# Patient Record
Sex: MALE | Race: WHITE | NOT HISPANIC OR LATINO | Employment: FULL TIME | URBAN - METROPOLITAN AREA
[De-identification: names, ages, dates, MRNs, and addresses within clinical notes are randomized per-mention and may not be internally consistent; named-entity substitution may affect disease eponyms.]

---

## 2017-01-06 ENCOUNTER — RECORDS - HEALTHEAST (OUTPATIENT)
Dept: LAB | Facility: CLINIC | Age: 62
End: 2017-01-06

## 2017-01-06 LAB
CHOLEST SERPL-MCNC: 218 MG/DL
FASTING STATUS PATIENT QL REPORTED: NO
HDLC SERPL-MCNC: 34 MG/DL
LDLC SERPL CALC-MCNC: 127 MG/DL
PSA SERPL-MCNC: 3.5 NG/ML (ref 0–4.5)
TRIGL SERPL-MCNC: 283 MG/DL

## 2018-02-21 ENCOUNTER — RECORDS - HEALTHEAST (OUTPATIENT)
Dept: LAB | Facility: CLINIC | Age: 63
End: 2018-02-21

## 2018-02-21 LAB
ALBUMIN SERPL-MCNC: 3.7 G/DL (ref 3.5–5)
ALBUMIN UR-MCNC: ABNORMAL MG/DL
ALP SERPL-CCNC: 107 U/L (ref 45–120)
ALT SERPL W P-5'-P-CCNC: 33 U/L (ref 0–45)
ANION GAP SERPL CALCULATED.3IONS-SCNC: 7 MMOL/L (ref 5–18)
APPEARANCE UR: CLEAR
AST SERPL W P-5'-P-CCNC: 22 U/L (ref 0–40)
BACTERIA #/AREA URNS HPF: ABNORMAL HPF
BILIRUB SERPL-MCNC: 0.6 MG/DL (ref 0–1)
BILIRUB UR QL STRIP: NEGATIVE
BUN SERPL-MCNC: 15 MG/DL (ref 8–22)
CALCIUM SERPL-MCNC: 9.3 MG/DL (ref 8.5–10.5)
CHLORIDE BLD-SCNC: 104 MMOL/L (ref 98–107)
CHOLEST SERPL-MCNC: 164 MG/DL
CO2 SERPL-SCNC: 28 MMOL/L (ref 22–31)
COLOR UR AUTO: YELLOW
CREAT SERPL-MCNC: 1.07 MG/DL (ref 0.7–1.3)
FASTING STATUS PATIENT QL REPORTED: ABNORMAL
GFR SERPL CREATININE-BSD FRML MDRD: >60 ML/MIN/1.73M2
GLUCOSE BLD-MCNC: 109 MG/DL (ref 70–125)
GLUCOSE UR STRIP-MCNC: NEGATIVE MG/DL
HDLC SERPL-MCNC: 31 MG/DL
HGB UR QL STRIP: ABNORMAL
KETONES UR STRIP-MCNC: NEGATIVE MG/DL
LDLC SERPL CALC-MCNC: 62 MG/DL
LDLC SERPL CALC-MCNC: ABNORMAL MG/DL
LEUKOCYTE ESTERASE UR QL STRIP: NEGATIVE
MUCOUS THREADS #/AREA URNS LPF: ABNORMAL LPF
NITRATE UR QL: NEGATIVE
PH UR STRIP: 6 [PH] (ref 4.5–8)
POTASSIUM BLD-SCNC: 4.3 MMOL/L (ref 3.5–5)
PROT SERPL-MCNC: 7 G/DL (ref 6–8)
RBC #/AREA URNS AUTO: ABNORMAL HPF
SODIUM SERPL-SCNC: 139 MMOL/L (ref 136–145)
SP GR UR STRIP: 1.01 (ref 1–1.03)
SQUAMOUS #/AREA URNS AUTO: ABNORMAL LPF
T4 FREE SERPL-MCNC: 0.8 NG/DL (ref 0.7–1.8)
TRIGL SERPL-MCNC: 544 MG/DL
TSH SERPL DL<=0.005 MIU/L-ACNC: 4.01 UIU/ML (ref 0.3–5)
UROBILINOGEN UR STRIP-ACNC: ABNORMAL
WBC #/AREA URNS AUTO: ABNORMAL HPF

## 2019-03-08 ENCOUNTER — RECORDS - HEALTHEAST (OUTPATIENT)
Dept: LAB | Facility: CLINIC | Age: 64
End: 2019-03-08

## 2019-03-08 LAB
ALBUMIN SERPL-MCNC: 3.7 G/DL (ref 3.5–5)
ALP SERPL-CCNC: 101 U/L (ref 45–120)
ALT SERPL W P-5'-P-CCNC: 31 U/L (ref 0–45)
ANION GAP SERPL CALCULATED.3IONS-SCNC: 10 MMOL/L (ref 5–18)
AST SERPL W P-5'-P-CCNC: 21 U/L (ref 0–40)
BILIRUB SERPL-MCNC: 0.5 MG/DL (ref 0–1)
BUN SERPL-MCNC: 16 MG/DL (ref 8–22)
CALCIUM SERPL-MCNC: 9.5 MG/DL (ref 8.5–10.5)
CHLORIDE BLD-SCNC: 105 MMOL/L (ref 98–107)
CHOLEST SERPL-MCNC: 153 MG/DL
CO2 SERPL-SCNC: 25 MMOL/L (ref 22–31)
CREAT SERPL-MCNC: 1.08 MG/DL (ref 0.7–1.3)
FASTING STATUS PATIENT QL REPORTED: ABNORMAL
GFR SERPL CREATININE-BSD FRML MDRD: >60 ML/MIN/1.73M2
GLUCOSE BLD-MCNC: 112 MG/DL (ref 70–125)
HDLC SERPL-MCNC: 31 MG/DL
LDLC SERPL CALC-MCNC: 46 MG/DL
POTASSIUM BLD-SCNC: 4.2 MMOL/L (ref 3.5–5)
PROT SERPL-MCNC: 6.7 G/DL (ref 6–8)
SODIUM SERPL-SCNC: 140 MMOL/L (ref 136–145)
TRIGL SERPL-MCNC: 379 MG/DL
TSH SERPL DL<=0.005 MIU/L-ACNC: 2.79 UIU/ML (ref 0.3–5)

## 2020-03-10 ENCOUNTER — RECORDS - HEALTHEAST (OUTPATIENT)
Dept: LAB | Facility: CLINIC | Age: 65
End: 2020-03-10

## 2020-03-10 LAB
ANION GAP SERPL CALCULATED.3IONS-SCNC: 10 MMOL/L (ref 5–18)
BUN SERPL-MCNC: 14 MG/DL (ref 8–22)
CALCIUM SERPL-MCNC: 9.8 MG/DL (ref 8.5–10.5)
CHLORIDE BLD-SCNC: 103 MMOL/L (ref 98–107)
CO2 SERPL-SCNC: 27 MMOL/L (ref 22–31)
CREAT SERPL-MCNC: 1.13 MG/DL (ref 0.7–1.3)
GFR SERPL CREATININE-BSD FRML MDRD: >60 ML/MIN/1.73M2
GLUCOSE BLD-MCNC: 97 MG/DL (ref 70–125)
POTASSIUM BLD-SCNC: 4.6 MMOL/L (ref 3.5–5)
SODIUM SERPL-SCNC: 140 MMOL/L (ref 136–145)

## 2020-09-11 ENCOUNTER — RECORDS - HEALTHEAST (OUTPATIENT)
Dept: LAB | Facility: CLINIC | Age: 65
End: 2020-09-11

## 2020-09-11 LAB
CHOLEST SERPL-MCNC: 162 MG/DL
FASTING STATUS PATIENT QL REPORTED: NO
HDLC SERPL-MCNC: 34 MG/DL
LDLC SERPL CALC-MCNC: 68 MG/DL
TRIGL SERPL-MCNC: 301 MG/DL
TSH SERPL DL<=0.005 MIU/L-ACNC: 4.24 UIU/ML (ref 0.3–5)

## 2021-01-04 ENCOUNTER — RECORDS - HEALTHEAST (OUTPATIENT)
Dept: LAB | Facility: CLINIC | Age: 66
End: 2021-01-04

## 2021-01-05 LAB
ANION GAP SERPL CALCULATED.3IONS-SCNC: 10 MMOL/L (ref 5–18)
BUN SERPL-MCNC: 18 MG/DL (ref 8–22)
CALCIUM SERPL-MCNC: 9.4 MG/DL (ref 8.5–10.5)
CHLORIDE BLD-SCNC: 100 MMOL/L (ref 98–107)
CO2 SERPL-SCNC: 29 MMOL/L (ref 22–31)
CREAT SERPL-MCNC: 1.25 MG/DL (ref 0.7–1.3)
GFR SERPL CREATININE-BSD FRML MDRD: 58 ML/MIN/1.73M2
GLUCOSE BLD-MCNC: 108 MG/DL (ref 70–125)
POTASSIUM BLD-SCNC: 4 MMOL/L (ref 3.5–5)
SODIUM SERPL-SCNC: 139 MMOL/L (ref 136–145)

## 2021-04-05 ENCOUNTER — RECORDS - HEALTHEAST (OUTPATIENT)
Dept: LAB | Facility: CLINIC | Age: 66
End: 2021-04-05

## 2021-04-06 LAB
ANION GAP SERPL CALCULATED.3IONS-SCNC: 11 MMOL/L (ref 5–18)
BUN SERPL-MCNC: 23 MG/DL (ref 8–22)
CALCIUM SERPL-MCNC: 9.6 MG/DL (ref 8.5–10.5)
CHLORIDE BLD-SCNC: 103 MMOL/L (ref 98–107)
CO2 SERPL-SCNC: 28 MMOL/L (ref 22–31)
CREAT SERPL-MCNC: 1.34 MG/DL (ref 0.7–1.3)
GFR SERPL CREATININE-BSD FRML MDRD: 53 ML/MIN/1.73M2
GLUCOSE BLD-MCNC: 103 MG/DL (ref 70–125)
POTASSIUM BLD-SCNC: 4 MMOL/L (ref 3.5–5)
SODIUM SERPL-SCNC: 142 MMOL/L (ref 136–145)

## 2021-06-02 ENCOUNTER — RECORDS - HEALTHEAST (OUTPATIENT)
Dept: ADMINISTRATIVE | Facility: CLINIC | Age: 66
End: 2021-06-02

## 2021-10-04 ENCOUNTER — LAB REQUISITION (OUTPATIENT)
Dept: LAB | Facility: CLINIC | Age: 66
End: 2021-10-04

## 2021-10-04 DIAGNOSIS — E78.2 MIXED HYPERLIPIDEMIA: ICD-10-CM

## 2021-10-04 DIAGNOSIS — Z12.5 ENCOUNTER FOR SCREENING FOR MALIGNANT NEOPLASM OF PROSTATE: ICD-10-CM

## 2021-10-04 DIAGNOSIS — Z11.8 ENCOUNTER FOR SCREENING FOR OTHER INFECTIOUS AND PARASITIC DISEASES: ICD-10-CM

## 2021-10-04 DIAGNOSIS — I10 ESSENTIAL (PRIMARY) HYPERTENSION: ICD-10-CM

## 2021-10-04 PROCEDURE — 80061 LIPID PANEL: CPT | Performed by: PHYSICIAN ASSISTANT

## 2021-10-04 PROCEDURE — 80053 COMPREHEN METABOLIC PANEL: CPT | Performed by: PHYSICIAN ASSISTANT

## 2021-10-04 PROCEDURE — 87491 CHLMYD TRACH DNA AMP PROBE: CPT | Performed by: PHYSICIAN ASSISTANT

## 2021-10-04 PROCEDURE — G0103 PSA SCREENING: HCPCS | Performed by: PHYSICIAN ASSISTANT

## 2021-10-05 LAB
ALBUMIN SERPL-MCNC: 4 G/DL (ref 3.5–5)
ALP SERPL-CCNC: 113 U/L (ref 45–120)
ALT SERPL W P-5'-P-CCNC: 29 U/L (ref 0–45)
ANION GAP SERPL CALCULATED.3IONS-SCNC: 15 MMOL/L (ref 5–18)
AST SERPL W P-5'-P-CCNC: 20 U/L (ref 0–40)
BILIRUB SERPL-MCNC: 1.2 MG/DL (ref 0–1)
BUN SERPL-MCNC: 23 MG/DL (ref 8–22)
CALCIUM SERPL-MCNC: 10.1 MG/DL (ref 8.5–10.5)
CHLORIDE BLD-SCNC: 98 MMOL/L (ref 98–107)
CHOLEST SERPL-MCNC: 143 MG/DL
CO2 SERPL-SCNC: 25 MMOL/L (ref 22–31)
CREAT SERPL-MCNC: 1.51 MG/DL (ref 0.7–1.3)
FASTING STATUS PATIENT QL REPORTED: ABNORMAL
GFR SERPL CREATININE-BSD FRML MDRD: 47 ML/MIN/1.73M2
GLUCOSE BLD-MCNC: 100 MG/DL (ref 70–125)
HDLC SERPL-MCNC: 32 MG/DL
LDLC SERPL CALC-MCNC: 48 MG/DL
POTASSIUM BLD-SCNC: 4.7 MMOL/L (ref 3.5–5)
PROT SERPL-MCNC: 7.2 G/DL (ref 6–8)
PSA SERPL-MCNC: 2.92 UG/L (ref 0–4.5)
SODIUM SERPL-SCNC: 138 MMOL/L (ref 136–145)
TRIGL SERPL-MCNC: 316 MG/DL

## 2021-10-07 LAB — C TRACH DNA SPEC QL NAA+PROBE: NEGATIVE

## 2023-01-26 ENCOUNTER — LAB REQUISITION (OUTPATIENT)
Dept: LAB | Facility: CLINIC | Age: 68
End: 2023-01-26

## 2023-01-26 DIAGNOSIS — I10 ESSENTIAL (PRIMARY) HYPERTENSION: ICD-10-CM

## 2023-01-26 DIAGNOSIS — E78.2 MIXED HYPERLIPIDEMIA: ICD-10-CM

## 2023-01-26 DIAGNOSIS — Z12.5 ENCOUNTER FOR SCREENING FOR MALIGNANT NEOPLASM OF PROSTATE: ICD-10-CM

## 2023-01-26 LAB
ALBUMIN SERPL BCG-MCNC: 4.3 G/DL (ref 3.5–5.2)
ALP SERPL-CCNC: 114 U/L (ref 40–129)
ALT SERPL W P-5'-P-CCNC: 26 U/L (ref 10–50)
ANION GAP SERPL CALCULATED.3IONS-SCNC: 11 MMOL/L (ref 7–15)
AST SERPL W P-5'-P-CCNC: 20 U/L (ref 10–50)
BILIRUB SERPL-MCNC: 0.7 MG/DL
BUN SERPL-MCNC: 17.2 MG/DL (ref 8–23)
CALCIUM SERPL-MCNC: 10 MG/DL (ref 8.8–10.2)
CHLORIDE SERPL-SCNC: 96 MMOL/L (ref 98–107)
CHOLEST SERPL-MCNC: 158 MG/DL
CREAT SERPL-MCNC: 1.27 MG/DL (ref 0.67–1.17)
DEPRECATED HCO3 PLAS-SCNC: 30 MMOL/L (ref 22–29)
GFR SERPL CREATININE-BSD FRML MDRD: 61 ML/MIN/1.73M2
GLUCOSE SERPL-MCNC: 100 MG/DL (ref 70–99)
HDLC SERPL-MCNC: 35 MG/DL
LDLC SERPL CALC-MCNC: 80 MG/DL
NONHDLC SERPL-MCNC: 123 MG/DL
POTASSIUM SERPL-SCNC: 4.1 MMOL/L (ref 3.4–5.3)
PROT SERPL-MCNC: 6.9 G/DL (ref 6.4–8.3)
PSA SERPL-MCNC: 3.3 NG/ML (ref 0–4.5)
SODIUM SERPL-SCNC: 137 MMOL/L (ref 136–145)
TRIGL SERPL-MCNC: 215 MG/DL

## 2023-01-26 PROCEDURE — 82947 ASSAY GLUCOSE BLOOD QUANT: CPT | Performed by: PHYSICIAN ASSISTANT

## 2023-01-26 PROCEDURE — 80061 LIPID PANEL: CPT | Performed by: PHYSICIAN ASSISTANT

## 2023-01-26 PROCEDURE — G0103 PSA SCREENING: HCPCS | Performed by: PHYSICIAN ASSISTANT

## 2023-03-24 ENCOUNTER — LAB REQUISITION (OUTPATIENT)
Dept: LAB | Facility: CLINIC | Age: 68
End: 2023-03-24

## 2023-03-24 DIAGNOSIS — E87.5 HYPERKALEMIA: ICD-10-CM

## 2023-03-24 LAB
ANION GAP SERPL CALCULATED.3IONS-SCNC: 12 MMOL/L (ref 7–15)
BUN SERPL-MCNC: 17 MG/DL (ref 8–23)
CALCIUM SERPL-MCNC: 9.4 MG/DL (ref 8.8–10.2)
CHLORIDE SERPL-SCNC: 104 MMOL/L (ref 98–107)
CREAT SERPL-MCNC: 1.21 MG/DL (ref 0.67–1.17)
DEPRECATED HCO3 PLAS-SCNC: 24 MMOL/L (ref 22–29)
GFR SERPL CREATININE-BSD FRML MDRD: 64 ML/MIN/1.73M2
GLUCOSE SERPL-MCNC: 84 MG/DL (ref 70–99)
POTASSIUM SERPL-SCNC: 4.5 MMOL/L (ref 3.4–5.3)
SODIUM SERPL-SCNC: 140 MMOL/L (ref 136–145)

## 2023-03-24 PROCEDURE — 80048 BASIC METABOLIC PNL TOTAL CA: CPT | Performed by: PHYSICIAN ASSISTANT

## 2023-07-07 ENCOUNTER — LAB REQUISITION (OUTPATIENT)
Dept: LAB | Facility: CLINIC | Age: 68
End: 2023-07-07

## 2023-07-07 DIAGNOSIS — R10.9 UNSPECIFIED ABDOMINAL PAIN: ICD-10-CM

## 2023-07-07 LAB
ALBUMIN SERPL BCG-MCNC: 4.3 G/DL (ref 3.5–5.2)
ALP SERPL-CCNC: 1149 U/L (ref 40–129)
ALT SERPL W P-5'-P-CCNC: 299 U/L (ref 0–70)
ANION GAP SERPL CALCULATED.3IONS-SCNC: 15 MMOL/L (ref 7–15)
AST SERPL W P-5'-P-CCNC: 149 U/L (ref 0–45)
BILIRUB SERPL-MCNC: 2.5 MG/DL
BUN SERPL-MCNC: 17.3 MG/DL (ref 8–23)
CALCIUM SERPL-MCNC: 9.9 MG/DL (ref 8.8–10.2)
CHLORIDE SERPL-SCNC: 98 MMOL/L (ref 98–107)
CREAT SERPL-MCNC: 1.35 MG/DL (ref 0.67–1.17)
DEPRECATED HCO3 PLAS-SCNC: 24 MMOL/L (ref 22–29)
GFR SERPL CREATININE-BSD FRML MDRD: 56 ML/MIN/1.73M2
GLUCOSE SERPL-MCNC: 108 MG/DL (ref 70–99)
POTASSIUM SERPL-SCNC: 4.8 MMOL/L (ref 3.4–5.3)
PROT SERPL-MCNC: 7.4 G/DL (ref 6.4–8.3)
SODIUM SERPL-SCNC: 137 MMOL/L (ref 136–145)

## 2023-07-07 PROCEDURE — 80053 COMPREHEN METABOLIC PANEL: CPT | Performed by: PHYSICIAN ASSISTANT

## 2023-07-21 ENCOUNTER — HOSPITAL ENCOUNTER (INPATIENT)
Facility: HOSPITAL | Age: 68
LOS: 5 days | Discharge: HOME | End: 2023-07-26
Attending: EMERGENCY MEDICINE | Admitting: INTERNAL MEDICINE
Payer: COMMERCIAL

## 2023-07-21 ENCOUNTER — ANESTHESIA EVENT (OUTPATIENT)
Dept: SURGERY | Facility: HOSPITAL | Age: 68
End: 2023-07-21
Payer: COMMERCIAL

## 2023-07-21 ENCOUNTER — APPOINTMENT (OUTPATIENT)
Dept: CT IMAGING | Facility: HOSPITAL | Age: 68
End: 2023-07-21
Attending: EMERGENCY MEDICINE
Payer: COMMERCIAL

## 2023-07-21 DIAGNOSIS — N17.9 ACUTE RENAL FAILURE, UNSPECIFIED ACUTE RENAL FAILURE TYPE (H): ICD-10-CM

## 2023-07-21 DIAGNOSIS — K83.1 BILIARY STRICTURE (H): Primary | ICD-10-CM

## 2023-07-21 DIAGNOSIS — R10.11 RUQ ABDOMINAL PAIN: ICD-10-CM

## 2023-07-21 DIAGNOSIS — R74.8 ABNORMAL LIVER ENZYMES: ICD-10-CM

## 2023-07-21 DIAGNOSIS — R11.2 NAUSEA AND VOMITING, UNSPECIFIED VOMITING TYPE: ICD-10-CM

## 2023-07-21 LAB
ALBUMIN MFR UR ELPH: 174.8 MG/DL
ALBUMIN SERPL BCG-MCNC: 3.3 G/DL (ref 3.5–5.2)
ALBUMIN UR-MCNC: 100 MG/DL
ALP SERPL-CCNC: 1870 U/L (ref 40–129)
ALT SERPL W P-5'-P-CCNC: 311 U/L (ref 0–70)
ANION GAP SERPL CALCULATED.3IONS-SCNC: 19 MMOL/L (ref 7–15)
ANION GAP SERPL CALCULATED.3IONS-SCNC: 19 MMOL/L (ref 7–15)
APAP SERPL-MCNC: <5 UG/ML (ref 10–30)
APPEARANCE UR: ABNORMAL
APTT PPP: 32 SECONDS (ref 22–38)
AST SERPL W P-5'-P-CCNC: 339 U/L (ref 0–45)
BACTERIA #/AREA URNS HPF: ABNORMAL /HPF
BASOPHILS # BLD AUTO: 0 10E3/UL (ref 0–0.2)
BASOPHILS NFR BLD AUTO: 0 %
BILIRUB DIRECT SERPL-MCNC: 2.85 MG/DL (ref 0–0.3)
BILIRUB SERPL-MCNC: 4 MG/DL
BILIRUB UR QL STRIP: ABNORMAL
BUN SERPL-MCNC: 99.4 MG/DL (ref 8–23)
BUN SERPL-MCNC: 99.4 MG/DL (ref 8–23)
CALCIUM SERPL-MCNC: 9.3 MG/DL (ref 8.8–10.2)
CALCIUM SERPL-MCNC: 9.3 MG/DL (ref 8.8–10.2)
CHLORIDE SERPL-SCNC: 97 MMOL/L (ref 98–107)
CHLORIDE SERPL-SCNC: 97 MMOL/L (ref 98–107)
CK SERPL-CCNC: 113 U/L (ref 39–308)
COLOR UR AUTO: ABNORMAL
CREAT SERPL-MCNC: 12.36 MG/DL (ref 0.67–1.17)
CREAT SERPL-MCNC: 12.36 MG/DL (ref 0.67–1.17)
CREAT UR-MCNC: 151.6 MG/DL
DEPRECATED HCO3 PLAS-SCNC: 16 MMOL/L (ref 22–29)
DEPRECATED HCO3 PLAS-SCNC: 16 MMOL/L (ref 22–29)
EOSINOPHIL # BLD AUTO: 0 10E3/UL (ref 0–0.7)
EOSINOPHIL NFR BLD AUTO: 0 %
ERYTHROCYTE [DISTWIDTH] IN BLOOD BY AUTOMATED COUNT: 14.8 % (ref 10–15)
ETHANOL SERPL-MCNC: <0.01 G/DL
GFR SERPL CREATININE-BSD FRML MDRD: 4 ML/MIN/1.73M2
GFR SERPL CREATININE-BSD FRML MDRD: 4 ML/MIN/1.73M2
GLUCOSE BLDC GLUCOMTR-MCNC: 113 MG/DL (ref 70–99)
GLUCOSE BLDC GLUCOMTR-MCNC: 130 MG/DL (ref 70–99)
GLUCOSE BLDC GLUCOMTR-MCNC: 148 MG/DL (ref 70–99)
GLUCOSE BLDC GLUCOMTR-MCNC: 167 MG/DL (ref 70–99)
GLUCOSE BLDC GLUCOMTR-MCNC: 182 MG/DL (ref 70–99)
GLUCOSE BLDC GLUCOMTR-MCNC: 80 MG/DL (ref 70–99)
GLUCOSE BLDC GLUCOMTR-MCNC: 96 MG/DL (ref 70–99)
GLUCOSE BLDC GLUCOMTR-MCNC: 98 MG/DL (ref 70–99)
GLUCOSE SERPL-MCNC: 100 MG/DL (ref 70–99)
GLUCOSE SERPL-MCNC: 100 MG/DL (ref 70–99)
GLUCOSE UR STRIP-MCNC: 50 MG/DL
GRANULAR CAST: 52 /LPF
HCT VFR BLD AUTO: 38.7 % (ref 40–53)
HGB BLD-MCNC: 12.7 G/DL (ref 13.3–17.7)
HGB UR QL STRIP: ABNORMAL
HOLD SPECIMEN: NORMAL
HOLD SPECIMEN: NORMAL
IMM GRANULOCYTES # BLD: 0.1 10E3/UL
IMM GRANULOCYTES NFR BLD: 1 %
INR PPP: 1.24 (ref 0.85–1.15)
INR PPP: 1.28 (ref 0.85–1.15)
KETONES UR STRIP-MCNC: NEGATIVE MG/DL
LEUKOCYTE ESTERASE UR QL STRIP: ABNORMAL
LIPASE SERPL-CCNC: 841 U/L (ref 13–60)
LYMPHOCYTES # BLD AUTO: 0.8 10E3/UL (ref 0.8–5.3)
LYMPHOCYTES NFR BLD AUTO: 5 %
MCH RBC QN AUTO: 31.1 PG (ref 26.5–33)
MCHC RBC AUTO-ENTMCNC: 32.8 G/DL (ref 31.5–36.5)
MCV RBC AUTO: 95 FL (ref 78–100)
MONOCYTES # BLD AUTO: 0.8 10E3/UL (ref 0–1.3)
MONOCYTES NFR BLD AUTO: 5 %
MUCOUS THREADS #/AREA URNS LPF: PRESENT /LPF
NEUTROPHILS # BLD AUTO: 14.3 10E3/UL (ref 1.6–8.3)
NEUTROPHILS NFR BLD AUTO: 89 %
NITRATE UR QL: NEGATIVE
NRBC # BLD AUTO: 0 10E3/UL
NRBC BLD AUTO-RTO: 0 /100
PH UR STRIP: 5 [PH] (ref 5–7)
PLATELET # BLD AUTO: 272 10E3/UL (ref 150–450)
POTASSIUM SERPL-SCNC: 5.4 MMOL/L (ref 3.4–5.3)
POTASSIUM SERPL-SCNC: 5.8 MMOL/L (ref 3.4–5.3)
POTASSIUM SERPL-SCNC: 5.9 MMOL/L (ref 3.4–5.3)
POTASSIUM SERPL-SCNC: 5.9 MMOL/L (ref 3.4–5.3)
POTASSIUM SERPL-SCNC: 6.1 MMOL/L (ref 3.4–5.3)
PROT SERPL-MCNC: 7.4 G/DL (ref 6.4–8.3)
PROT/CREAT 24H UR: 1.15 MG/MG CR (ref 0–0.2)
RBC # BLD AUTO: 4.08 10E6/UL (ref 4.4–5.9)
RBC URINE: 0 /HPF
SODIUM SERPL-SCNC: 132 MMOL/L (ref 136–145)
SODIUM SERPL-SCNC: 132 MMOL/L (ref 136–145)
SODIUM UR-SCNC: 32 MMOL/L
SP GR UR STRIP: 1.02 (ref 1–1.03)
SQUAMOUS EPITHELIAL: 1 /HPF
TRANSITIONAL EPI: <1 /HPF
UROBILINOGEN UR STRIP-MCNC: <2 MG/DL
WBC # BLD AUTO: 16.1 10E3/UL (ref 4–11)
WBC URINE: 28 /HPF

## 2023-07-21 PROCEDURE — 80074 ACUTE HEPATITIS PANEL: CPT | Performed by: EMERGENCY MEDICINE

## 2023-07-21 PROCEDURE — 84132 ASSAY OF SERUM POTASSIUM: CPT | Performed by: EMERGENCY MEDICINE

## 2023-07-21 PROCEDURE — 85025 COMPLETE CBC W/AUTO DIFF WBC: CPT | Performed by: EMERGENCY MEDICINE

## 2023-07-21 PROCEDURE — 250N000009 HC RX 250: Performed by: EMERGENCY MEDICINE

## 2023-07-21 PROCEDURE — 82248 BILIRUBIN DIRECT: CPT | Performed by: EMERGENCY MEDICINE

## 2023-07-21 PROCEDURE — 86038 ANTINUCLEAR ANTIBODIES: CPT | Performed by: INTERNAL MEDICINE

## 2023-07-21 PROCEDURE — 36415 COLL VENOUS BLD VENIPUNCTURE: CPT | Performed by: EMERGENCY MEDICINE

## 2023-07-21 PROCEDURE — 250N000012 HC RX MED GY IP 250 OP 636 PS 637: Performed by: EMERGENCY MEDICINE

## 2023-07-21 PROCEDURE — 86162 COMPLEMENT TOTAL (CH50): CPT | Performed by: INTERNAL MEDICINE

## 2023-07-21 PROCEDURE — 84132 ASSAY OF SERUM POTASSIUM: CPT | Performed by: INTERNAL MEDICINE

## 2023-07-21 PROCEDURE — 84300 ASSAY OF URINE SODIUM: CPT | Performed by: INTERNAL MEDICINE

## 2023-07-21 PROCEDURE — 258N000001 HC RX 258: Performed by: EMERGENCY MEDICINE

## 2023-07-21 PROCEDURE — 96376 TX/PRO/DX INJ SAME DRUG ADON: CPT

## 2023-07-21 PROCEDURE — 250N000009 HC RX 250: Performed by: INTERNAL MEDICINE

## 2023-07-21 PROCEDURE — 80053 COMPREHEN METABOLIC PANEL: CPT | Performed by: EMERGENCY MEDICINE

## 2023-07-21 PROCEDURE — 82550 ASSAY OF CK (CPK): CPT | Performed by: INTERNAL MEDICINE

## 2023-07-21 PROCEDURE — 80143 DRUG ASSAY ACETAMINOPHEN: CPT | Performed by: EMERGENCY MEDICINE

## 2023-07-21 PROCEDURE — 99255 IP/OBS CONSLTJ NEW/EST HI 80: CPT | Performed by: INTERNAL MEDICINE

## 2023-07-21 PROCEDURE — 258N000003 HC RX IP 258 OP 636: Performed by: INTERNAL MEDICINE

## 2023-07-21 PROCEDURE — 85610 PROTHROMBIN TIME: CPT | Performed by: EMERGENCY MEDICINE

## 2023-07-21 PROCEDURE — 99223 1ST HOSP IP/OBS HIGH 75: CPT | Performed by: INTERNAL MEDICINE

## 2023-07-21 PROCEDURE — 82962 GLUCOSE BLOOD TEST: CPT

## 2023-07-21 PROCEDURE — 82077 ASSAY SPEC XCP UR&BREATH IA: CPT | Performed by: EMERGENCY MEDICINE

## 2023-07-21 PROCEDURE — 120N000001 HC R&B MED SURG/OB

## 2023-07-21 PROCEDURE — 83690 ASSAY OF LIPASE: CPT | Performed by: EMERGENCY MEDICINE

## 2023-07-21 PROCEDURE — 96375 TX/PRO/DX INJ NEW DRUG ADDON: CPT

## 2023-07-21 PROCEDURE — 258N000003 HC RX IP 258 OP 636: Performed by: EMERGENCY MEDICINE

## 2023-07-21 PROCEDURE — 93005 ELECTROCARDIOGRAM TRACING: CPT | Performed by: EMERGENCY MEDICINE

## 2023-07-21 PROCEDURE — 250N000011 HC RX IP 250 OP 636: Mod: JZ | Performed by: EMERGENCY MEDICINE

## 2023-07-21 PROCEDURE — 85610 PROTHROMBIN TIME: CPT | Performed by: INTERNAL MEDICINE

## 2023-07-21 PROCEDURE — 85730 THROMBOPLASTIN TIME PARTIAL: CPT | Performed by: EMERGENCY MEDICINE

## 2023-07-21 PROCEDURE — 36415 COLL VENOUS BLD VENIPUNCTURE: CPT | Performed by: INTERNAL MEDICINE

## 2023-07-21 PROCEDURE — 87086 URINE CULTURE/COLONY COUNT: CPT | Performed by: EMERGENCY MEDICINE

## 2023-07-21 PROCEDURE — 81001 URINALYSIS AUTO W/SCOPE: CPT | Performed by: EMERGENCY MEDICINE

## 2023-07-21 PROCEDURE — 86160 COMPLEMENT ANTIGEN: CPT | Performed by: INTERNAL MEDICINE

## 2023-07-21 PROCEDURE — 96365 THER/PROPH/DIAG IV INF INIT: CPT | Mod: 59

## 2023-07-21 PROCEDURE — 99285 EMERGENCY DEPT VISIT HI MDM: CPT | Mod: 25

## 2023-07-21 PROCEDURE — 84156 ASSAY OF PROTEIN URINE: CPT | Performed by: INTERNAL MEDICINE

## 2023-07-21 PROCEDURE — 250N000013 HC RX MED GY IP 250 OP 250 PS 637: Performed by: EMERGENCY MEDICINE

## 2023-07-21 PROCEDURE — 74176 CT ABD & PELVIS W/O CONTRAST: CPT

## 2023-07-21 RX ORDER — SILDENAFIL 100 MG/1
100 TABLET, FILM COATED ORAL DAILY PRN
COMMUNITY

## 2023-07-21 RX ORDER — ASPIRIN 81 MG/1
81 TABLET ORAL AT BEDTIME
COMMUNITY

## 2023-07-21 RX ORDER — CALCIUM GLUCONATE 20 MG/ML
1 INJECTION, SOLUTION INTRAVENOUS ONCE
Status: COMPLETED | OUTPATIENT
Start: 2023-07-21 | End: 2023-07-21

## 2023-07-21 RX ORDER — LIDOCAINE 40 MG/G
CREAM TOPICAL
Status: DISCONTINUED | OUTPATIENT
Start: 2023-07-21 | End: 2023-07-26 | Stop reason: HOSPADM

## 2023-07-21 RX ORDER — PIPERACILLIN SODIUM, TAZOBACTAM SODIUM 3; .375 G/15ML; G/15ML
3.38 INJECTION, POWDER, LYOPHILIZED, FOR SOLUTION INTRAVENOUS EVERY 12 HOURS
Status: COMPLETED | OUTPATIENT
Start: 2023-07-22 | End: 2023-07-22

## 2023-07-21 RX ORDER — NITROGLYCERIN 0.4 MG/1
0.4 TABLET SUBLINGUAL EVERY 5 MIN PRN
Status: DISCONTINUED | OUTPATIENT
Start: 2023-07-21 | End: 2023-07-26 | Stop reason: HOSPADM

## 2023-07-21 RX ORDER — NICOTINE POLACRILEX 4 MG
15-30 LOZENGE BUCCAL
Status: DISCONTINUED | OUTPATIENT
Start: 2023-07-21 | End: 2023-07-26 | Stop reason: HOSPADM

## 2023-07-21 RX ORDER — CLOPIDOGREL BISULFATE 75 MG/1
75 TABLET ORAL DAILY
COMMUNITY

## 2023-07-21 RX ORDER — NITROGLYCERIN 0.4 MG/1
0.4 TABLET SUBLINGUAL EVERY 5 MIN PRN
COMMUNITY

## 2023-07-21 RX ORDER — LOSARTAN POTASSIUM 50 MG/1
50 TABLET ORAL AT BEDTIME
Status: ON HOLD | COMMUNITY
End: 2023-07-26

## 2023-07-21 RX ORDER — PROCHLORPERAZINE MALEATE 5 MG
5 TABLET ORAL EVERY 6 HOURS PRN
Status: DISCONTINUED | OUTPATIENT
Start: 2023-07-21 | End: 2023-07-26 | Stop reason: HOSPADM

## 2023-07-21 RX ORDER — ONDANSETRON 2 MG/ML
4 INJECTION INTRAMUSCULAR; INTRAVENOUS ONCE
Status: COMPLETED | OUTPATIENT
Start: 2023-07-21 | End: 2023-07-21

## 2023-07-21 RX ORDER — PANTOPRAZOLE SODIUM 40 MG/1
40 TABLET, DELAYED RELEASE ORAL AT BEDTIME
COMMUNITY

## 2023-07-21 RX ORDER — DEXTROSE MONOHYDRATE 25 G/50ML
25 INJECTION, SOLUTION INTRAVENOUS ONCE
Status: COMPLETED | OUTPATIENT
Start: 2023-07-21 | End: 2023-07-21

## 2023-07-21 RX ORDER — ONDANSETRON 2 MG/ML
4 INJECTION INTRAMUSCULAR; INTRAVENOUS EVERY 6 HOURS PRN
Status: DISCONTINUED | OUTPATIENT
Start: 2023-07-21 | End: 2023-07-26 | Stop reason: HOSPADM

## 2023-07-21 RX ORDER — DICYCLOMINE HCL 20 MG
20 TABLET ORAL ONCE
Status: COMPLETED | OUTPATIENT
Start: 2023-07-21 | End: 2023-07-21

## 2023-07-21 RX ORDER — PIPERACILLIN SODIUM, TAZOBACTAM SODIUM 2; .25 G/10ML; G/10ML
2.25 INJECTION, POWDER, LYOPHILIZED, FOR SOLUTION INTRAVENOUS EVERY 8 HOURS
Status: DISCONTINUED | OUTPATIENT
Start: 2023-07-21 | End: 2023-07-21 | Stop reason: DRUGHIGH

## 2023-07-21 RX ORDER — ACETAMINOPHEN 325 MG/1
325 TABLET ORAL EVERY 6 HOURS PRN
Status: DISCONTINUED | OUTPATIENT
Start: 2023-07-21 | End: 2023-07-26 | Stop reason: HOSPADM

## 2023-07-21 RX ORDER — ROSUVASTATIN CALCIUM 40 MG/1
40 TABLET, COATED ORAL AT BEDTIME
COMMUNITY

## 2023-07-21 RX ORDER — PIPERACILLIN SODIUM, TAZOBACTAM SODIUM 3; .375 G/15ML; G/15ML
3.38 INJECTION, POWDER, LYOPHILIZED, FOR SOLUTION INTRAVENOUS ONCE
Status: COMPLETED | OUTPATIENT
Start: 2023-07-21 | End: 2023-07-21

## 2023-07-21 RX ORDER — PROCHLORPERAZINE 25 MG
12.5 SUPPOSITORY, RECTAL RECTAL EVERY 12 HOURS PRN
Status: DISCONTINUED | OUTPATIENT
Start: 2023-07-21 | End: 2023-07-26 | Stop reason: HOSPADM

## 2023-07-21 RX ORDER — ONDANSETRON 4 MG/1
4 TABLET, ORALLY DISINTEGRATING ORAL EVERY 6 HOURS PRN
Status: DISCONTINUED | OUTPATIENT
Start: 2023-07-21 | End: 2023-07-26 | Stop reason: HOSPADM

## 2023-07-21 RX ORDER — DEXTROSE MONOHYDRATE 25 G/50ML
25-50 INJECTION, SOLUTION INTRAVENOUS
Status: DISCONTINUED | OUTPATIENT
Start: 2023-07-21 | End: 2023-07-26 | Stop reason: HOSPADM

## 2023-07-21 RX ORDER — METOPROLOL SUCCINATE 25 MG/1
25 TABLET, EXTENDED RELEASE ORAL AT BEDTIME
COMMUNITY

## 2023-07-21 RX ORDER — ACETAMINOPHEN 650 MG/1
650 SUPPOSITORY RECTAL EVERY 6 HOURS PRN
Status: DISCONTINUED | OUTPATIENT
Start: 2023-07-21 | End: 2023-07-26 | Stop reason: HOSPADM

## 2023-07-21 RX ADMIN — ONDANSETRON 4 MG: 2 INJECTION INTRAMUSCULAR; INTRAVENOUS at 13:20

## 2023-07-21 RX ADMIN — FAMOTIDINE 20 MG: 10 INJECTION, SOLUTION INTRAVENOUS at 13:24

## 2023-07-21 RX ADMIN — SODIUM BICARBONATE 50 MEQ: 84 INJECTION, SOLUTION INTRAVENOUS at 15:16

## 2023-07-21 RX ADMIN — SODIUM BICARBONATE: 84 INJECTION, SOLUTION INTRAVENOUS at 16:38

## 2023-07-21 RX ADMIN — SODIUM CHLORIDE 9.3 UNITS: 9 INJECTION, SOLUTION INTRAVENOUS at 15:09

## 2023-07-21 RX ADMIN — PIPERACILLIN AND TAZOBACTAM 3.38 G: 3; .375 INJECTION, POWDER, LYOPHILIZED, FOR SOLUTION INTRAVENOUS at 17:35

## 2023-07-21 RX ADMIN — DICYCLOMINE HYDROCHLORIDE 20 MG: 20 TABLET ORAL at 13:27

## 2023-07-21 RX ADMIN — DEXTROSE MONOHYDRATE 300 ML: 100 INJECTION, SOLUTION INTRAVENOUS at 15:28

## 2023-07-21 RX ADMIN — CALCIUM GLUCONATE 1 G: 20 INJECTION, SOLUTION INTRAVENOUS at 15:11

## 2023-07-21 RX ADMIN — DEXTROSE MONOHYDRATE 25 G: 25 INJECTION, SOLUTION INTRAVENOUS at 15:04

## 2023-07-21 ASSESSMENT — ACTIVITIES OF DAILY LIVING (ADL)
ADLS_ACUITY_SCORE: 35
ADLS_ACUITY_SCORE: 35
DRESSING/BATHING_DIFFICULTY: NO
WALKING_OR_CLIMBING_STAIRS_DIFFICULTY: NO
ADLS_ACUITY_SCORE: 37
DEPENDENT_IADLS:: INDEPENDENT
TOILETING_ISSUES: NO
VISION_MANAGEMENT: GLASSES
DOING_ERRANDS_INDEPENDENTLY_DIFFICULTY: NO
CONCENTRATING,_REMEMBERING_OR_MAKING_DECISIONS_DIFFICULTY: NO
WEAR_GLASSES_OR_BLIND: YES
ADLS_ACUITY_SCORE: 22
ADLS_ACUITY_SCORE: 35
DIFFICULTY_EATING/SWALLOWING: NO

## 2023-07-21 NOTE — H&P
[facility]    History and Physical - Hospitalist Service       Date of Admission:  7/21/2023    Assessment & Plan      Rafael Santillan is a 68 year old male admitted on 7/21/2023. He presented with 2 weeks of off-and-on abdominal pain nausea and vomiting was found to have acute renal failure, hyperkalemia, elevated liver enzymes.  Nephrology and GI consulted.  Patient is n.p.o. for ERCP.  Started on bicarb drip.  UA is positive for leukocytes and therefore on empirical Zosyn.    Abdominal pain and elevated liver enzyme likely due to biliary stricture  --Patient has biliary stents.  --GI consulted for ERCP  --Started on empirical Zosyn  --Currently on bicarb drip    Acute renal failure with hyperkalemia  Metabolic acidosis  --Started on bicarb drip  --Baseline creatinine 1.3  Hold losartan, statin, aspirin, Plavix    GERD.    -Continue home medications  --Order IV Protonix    Hyponatremia likely due to hypovolemia  --Currently on bicarb drip    Leukocytosis likely reactive  --Continue empirical Zosyn  Follow-up urine culture    History of mitral regurgitation  --Does not appear to be fluid overloaded  --Monitor fluid status every day to avoid hypervolemia     Diet: NPO for Medical/Clinical Reasons Except for: No Exceptions  NPO per Anesthesia Guidelines for Procedure/Surgery Except for: Meds    DVT Prophylaxis: Low Risk/Ambulatory with no VTE prophylaxis indicated  Cordero Catheter: Not present  Lines: None     Cardiac Monitoring: None  Code Status:  Full    Clinically Significant Risk Factors Present on Admission        # Hyperkalemia: Highest K = 5.9 mmol/L in last 2 days, will monitor as appropriate      # Anion Gap Metabolic Acidosis: Highest Anion Gap = 19 mmol/L in last 2 days, will monitor and treat as appropriate  # Hypoalbuminemia: Lowest albumin = 3.3 g/dL at 7/21/2023  1:13 PM, will monitor as appropriate  # Coagulation Defect: INR = 1.24 (Ref range: 0.85 - 1.15) and/or PTT = 32  "Seconds (Ref range: 22 - 38 Seconds), will monitor for bleeding   # Acute Kidney Injury, unspecified: based on a >150% or 0.3 mg/dL increase in last creatinine compared to past 90 day average, will monitor renal function       # Overweight: Estimated body mass index is 29.41 kg/m  as calculated from the following:    Height as of this encounter: 1.778 m (5' 10\").    Weight as of this encounter: 93 kg (205 lb).            Disposition Plan      Expected Discharge Date: 07/23/2023                  Jian Jeronimo MD  Hospitalist Service  [facility]  Securely message with [employer] (more info)  Text page via [facility] Paging/Directory     ______________________________________________________________________    Chief Complaint   Abdominal pain, nausea, vomiting,  unable to maintain hydration- 2 weeks    History is obtained from the patient    History of Present Illness   Rafael Santillan is a 68 year old male with history of biliary stents, GERD, hypertension, CKD 3 with creatinine of 1.3 who presented with 2 weeks of off-and-on abdominal pain, nausea and vomiting.  Patient claims he is unable to keep his hydration although he tries to drink a water daily.  He was scheduled to have ERCP as outpatient but he is unable to take the pain anymore.  In the ER patient was found to have acute renal failure and hyperkalemia as well as elevated liver enzymes and therefore nephrology and GI consulted.  Patient is now on bicarb drip.        Past Medical History    No past medical history on file.    Past Surgical History   No past surgical history on file.    Prior to Admission Medications   None        Social History   I have reviewed this patient's social history and updated it with pertinent information if needed.    Drinks 2-3 beers daily.  Last drink was about 5 days ago  Does not smoke    Physical Exam   Vital Signs: Temp: 97.9  F (36.6  C) Temp src: Temporal BP: 113/58 Pulse: 61   Resp: 21 SpO2: 99 % O2 Device: " None (Room air)    Weight: 205 lbs 0 oz    Dry mucous membrane  Appears flushed  Systolic murmur   Lungs clear to auscultation      Medical Decision Making       75 MINUTES SPENT BY ME on the date of service doing chart review, history, exam, documentation & further activities per the note.  MANAGEMENT DISCUSSED with the following over the past 24 hours: Patient   NOTE(S)/MEDICAL RECORDS REVIEWED over the past 24 hours: ER notes      Data     I have personally reviewed the following data over the past 24 hrs:    16.1 (H)  \   12.7 (L)   / 272     132 (L) 97 (L) 99.4 (H) /  167 (H)   5.8 (H) 16 (L) 12.36 (H) \       ALT: 311 (H) AST: 339 (H) AP: 1,870 (H) TBILI: 4.0 (H)   ALB: 3.3 (L) TOT PROTEIN: 7.4 LIPASE: 841 (H)       INR:  1.24 (H) PTT:  32   D-dimer:  N/A Fibrinogen:  N/A       Imaging results reviewed over the past 24 hrs:   Recent Results (from the past 24 hour(s))   CT Abdomen Pelvis w/o Contrast    Narrative    EXAM: CT ABDOMEN PELVIS W/O CONTRAST  LOCATION: [facility]  DATE: 7/21/2023    INDICATION: right side abd pain, vomiting for 2 weeks  COMPARISON: 06/20/2010  TECHNIQUE: CT scan of the abdomen and pelvis was performed without IV contrast. Multiplanar reformats were obtained. Dose reduction techniques were used.  CONTRAST: None.    FINDINGS:   LOWER CHEST: Partially imaged coronary artery calcifications    HEPATOBILIARY: Cholecystectomy. Progressive atrophy of the left hepatic lobe with some mild intrahepatic biliary dilation    PANCREAS: Normal.    SPLEEN: Normal.    ADRENAL GLANDS: Normal.    KIDNEYS/BLADDER: No significant mass, stone, or hydronephrosis. Circumferential bladder wall thickening.    BOWEL: Diverticulosis of the colon. No acute inflammatory change. No obstruction.     LYMPH NODES: Normal.    VASCULATURE: Minimal atherosclerotic calcification. No aneurysm.    PELVIC ORGANS: Enlarged prostate.    MUSCULOSKELETAL: Fat-containing right inguinal hernia. Right lower  quadrant spigelian hernia containing nonobstructed small bowel. Mild degenerative changes in the spine.      Impression    IMPRESSION:   1.  Circumferential urinary bladder wall thickening could be secondary to underdistention or cystitis. Correlate with urinalysis.     2.  Sequelae of chronic biliary stricture in the left hepatic lobe with associated mild intrahepatic biliary ductal dilatation and parenchymal atrophy.           no

## 2023-07-21 NOTE — PHARMACY-ADMISSION MEDICATION HISTORY
"Pharmacist Admission Medication History    Admission medication history is complete. The information provided in this note is only as accurate as the sources available at the time of the update.    Medication reconciliation/reorder completed by provider prior to medication history? No    Information Source(s): Patient and CareEverywhere/SureScripts via in-person    Pertinent Information:   -Pt states he takes all meds at bedtime except for \"an oblong medication\" he takes for \"blood build up around stents\". Pt takes this when he wakes up, usually around 3am. Suspect this is plavix. Pt states he was told to separate it from other medications, unclear why he was told to do so.     Changes made to PTA medication list:    Added: all    Deleted: None    Changed: None    Medication Affordability:  Not including over the counter (OTC) medications, was there a time in the past 3 months when you did not take your medications as prescribed because of cost?: No    Allergies reviewed with patient and updates made in EHR: yes    Medication History Completed By: Elizabeth Fregoso [facility] 7/21/2023 5:06 PM    Prior to Admission medications    Medication Sig Last Dose Taking? Auth Provider Long Term End Date   aspirin 81 MG EC tablet Take 81 mg by mouth At Bedtime 7/20/2023 at pm Yes Unknown, Entered By History     clopidogrel (PLAVIX) 75 MG tablet Take 75 mg by mouth daily 7/21/2023 at 3am Yes Unknown, Entered By History Yes    losartan (COZAAR) 50 MG tablet Take 50 mg by mouth At Bedtime 7/20/2023 Yes Unknown, Entered By History Yes    metoprolol succinate ER (TOPROL XL) 25 MG 24 hr tablet Take 25 mg by mouth At Bedtime 7/20/2023 at pm Yes Unknown, Entered By History Yes    nitroGLYcerin (NITROSTAT) 0.4 MG sublingual tablet Place 0.4 mg under the tongue every 5 minutes as needed for chest pain For chest pain place 1 tablet under the tongue every 5 minutes for 3 doses. If symptoms persist 5 minutes after 1st dose call 911. Unknown at " prn Yes Unknown, Entered By History Yes    pantoprazole (PROTONIX) 40 MG EC tablet Take 40 mg by mouth At Bedtime 7/20/2023 at pm Yes Unknown, Entered By History     rosuvastatin (CRESTOR) 40 MG tablet Take 40 mg by mouth At Bedtime  Yes Unknown, Entered By History Yes    sildenafil (VIAGRA) 100 MG tablet Take 100 mg by mouth daily as needed Unknown at prn Yes Unknown, Entered By History Yes

## 2023-07-21 NOTE — ED PROVIDER NOTES
Emergency Department Encounter     Evaluation Date & Time:   No admission date for patient encounter.    CHIEF COMPLAINT:  Abdominal Pain      Triage Note:  Pt presents with upper right abd pain. Pt has been dealing with this pain for some time. Had labs done on 7/07,   Scan on 7/17. Labs revealed elevated AST, ALT. Pt was to have ERCP with [facility] but is a 6 week wait. Pt states he cannot take this pain and nausea that long. Pt reports unable to eat or drink, due to nausea. Vomited this am.                ED COURSE & MEDICAL DECISION MAKING:     ED Course as of 07/21/23 1921 Fri Jul 21, 2023   1351 Bili 2.85, previously 2.5 two weeks ago.   1437 Pt with new severe KATHY and K of 5.9.  Charge informed, will need to bring back to bed as pt still in lobby due to extreme bed crisis.    Hyperkalemia meds ordered. Suspect this may be more hepatorenal and will need GI and nephrology consultation.  CT changed to w/o contrast.   1528 Lipase 841, LFTs and bili grossly worsening.  CT neg for obstructive process/mass.  Will speak with GI, nephrology and admit.   1553 Pt updated on results. Remains clinically well here. Nephrology to see in ED, writing for bicarb gtt.   1653 I spoke with hospitalist, who accepts for hospitalization.    GI saw pt in ED and will do ERCP tomorrow.  He was able to find out from record review that pt had multiple ERCPs in the past, which pt was unaware of initially our discussions.     Pt here with ongoing, intermittent n/v and ruq abd pain pain for the past 2 weeks or so, seen by primary clinic with labs and referred to [facility], but won't see until August.  Pt reports symptoms worsened by eating/drinking. Denies fevers, bloody/black stools or hematemesis, but having paler stools. Pt denies heavy etoh use or new medications.  He's taking absolutely nothing for his symptoms, but arrives for uncontrolled symptoms.  Very benign abdomen, reassuring vitals.  Will get labs, CT imaging, treat symptomatically  and reassess.    1:03 PM Met with patient for initial interview and exam.  3:28 PM Paged GI.  3:29 PM Paged Nephrology.  3:46 PM Spoke with Dr. Mckeon with Nephrology about patient.  3:49 PM Updated patient.  3:54 PM Discussed patient with Dr. Yeh with GI.  4:15 PM Paged hospitalist.  4:29 PM Discussed patient with Dr. Yeh in person.  4:40 PM Discussed patient with hospitalist, Dr. Jeronimo. Accepts patient for admission.      Medical Decision Making    History:    Supplemental history from: Documented in chart, if applicable    External Record(s) reviewed: Outpatient Record: 06/22/2023 Office Visit    Work Up:    Chart documentation includes differential considered and any EKGs or imaging independently interpreted by provider, where specified.    In additional to work up documented, I considered the following work up: Documented in chart, if applicable.    External consultation:    Discussion of management with another provider: Documented in chart, if applicable    Complicating factors:    Care impacted by chronic illness: Heart Disease and Hyperlipidemia    Care affected by social determinants of health: N/A    Disposition considerations: Admit.      At the conclusion of the encounter I discussed the results of all the tests and the disposition. The questions were answered. The patient or family acknowledged understanding and was agreeable with the care plan.      MEDICATIONS GIVEN IN THE EMERGENCY DEPARTMENT:  Medications   glucose gel 15-30 g (has no administration in time range)     Or   dextrose 50 % injection 25-50 mL (has no administration in time range)     Or   glucagon injection 1 mg (has no administration in time range)   sodium bicarbonate 150 mEq in D5W 1,000 mL infusion ( Intravenous Rate/Dose Verify 7/21/23 1909)   lidocaine 1 % 0.1-1 mL (has no administration in time range)   lidocaine (LMX4) cream (has no administration in time range)   sodium chloride (PF) 0.9% PF flush 3 mL (3 mLs  Intracatheter Not Given 7/21/23 1909)   sodium chloride (PF) 0.9% PF flush 3 mL (has no administration in time range)   melatonin tablet 1 mg (has no administration in time range)   acetaminophen (TYLENOL) tablet 325 mg (has no administration in time range)     Or   acetaminophen (TYLENOL) Suppository 650 mg (has no administration in time range)   ondansetron (ZOFRAN ODT) ODT tab 4 mg (has no administration in time range)     Or   ondansetron (ZOFRAN) injection 4 mg (has no administration in time range)   prochlorperazine (COMPAZINE) injection 5 mg (has no administration in time range)     Or   prochlorperazine (COMPAZINE) tablet 5 mg (has no administration in time range)     Or   prochlorperazine (COMPAZINE) suppository 12.5 mg (has no administration in time range)   pantoprazole (PROTONIX) IV push injection 40 mg (has no administration in time range)   nitroGLYcerin (NITROSTAT) sublingual tablet 0.4 mg (has no administration in time range)   piperacillin-tazobactam (ZOSYN) 3.375 g vial to attach to  mL bag (has no administration in time range)   ondansetron (ZOFRAN) injection 4 mg (4 mg Intravenous $Given 7/21/23 1320)   famotidine (PEPCID) injection 20 mg (20 mg Intravenous $Given 7/21/23 1324)   dicyclomine (BENTYL) tablet 20 mg (20 mg Oral $Given 7/21/23 1327)   calcium gluconate 1 g in 50 mL sodium chloride intermittent infusion (premix) (1 g Intravenous $Given 7/21/23 1511)   sodium bicarbonate 8.4 % injection 50 mEq (50 mEq Intravenous $Given 7/21/23 1516)   dextrose 10% BOLUS (0 mLs Intravenous Stopped 7/21/23 1634)   dextrose 50 % injection 25 g (25 g Intravenous $Given 7/21/23 1504)   insulin regular 1 unit/mL injection 9.3 Units (9.3 Units Intravenous $Given 7/21/23 1509)   piperacillin-tazobactam (ZOSYN) 3.375 g vial to attach to  mL bag (3.375 g Intravenous $New Bag 7/21/23 9743)       NEW PRESCRIPTIONS STARTED AT TODAY'S ED VISIT:  Current Discharge Medication List          HPI   The  "history is provided by the patient. No  was used.        Rafael Santillan is a 68 year old male with a pertinent history of coronary artery disease, s/p appendectomy, hyperlipidemia, GERD, and NSTEMI who presents to this ED by walk in for evaluation of abdominal pain.    Per chart review, patient presented to [facility] on 3/20/2023 with atypical chest pain and minimal rise in troponin without EKG changes. He was noted to have CAD on prior CAC score and coronary angiography was recommended for which he underwent on 3/20/2023 with x 1 to dRCA, FELICIA x 2 to pRCA. Of note, echocardiogram demonstrated normal LVEF and no wall motion abnormalities, but moderate to severe mitral regurgitation was noted    The patient reports right upper quadrant pain for two weeks, accompanied by nausea and vomiting. He describes the pain as a pressure and states that he cannot keep anything down. He says that the pain sometimes goes away. He has not taken any medications for pain or nausea.    Patient states that he was not able to get a GI appointment until the end of August. He denies black stools, hematemesis, fever, trouble breathing, chest pain, and cough. He denies daily alcohol use.      REVIEW OF SYSTEMS:  See HPI      Medical History   No past medical history on file.    No past surgical history on file.    No family history on file.         No current outpatient medications on file.      Physical Exam     Vitals:  /57 (BP Location: Right arm)   Pulse 59   Temp 97.8  F (36.6  C) (Oral)   Resp 18   Ht 1.778 m (5' 10\")   Wt 96.8 kg (213 lb 6.5 oz)   SpO2 97%   BMI 30.62 kg/m      PHYSICAL EXAM:   Physical Exam  Vitals and nursing note reviewed.   Constitutional:       General: He is not in acute distress.     Appearance: Normal appearance.   HENT:      Head: Normocephalic and atraumatic.      Nose: Nose normal.      Mouth/Throat:      Mouth: Mucous membranes are moist.   Eyes:      Pupils: Pupils " are equal, round, and reactive to light.   Cardiovascular:      Rate and Rhythm: Normal rate and regular rhythm.      Pulses: Normal pulses.           Radial pulses are 2+ on the right side and 2+ on the left side.        Dorsalis pedis pulses are 2+ on the right side and 2+ on the left side.   Pulmonary:      Effort: Pulmonary effort is normal. No respiratory distress.      Breath sounds: Normal breath sounds.   Abdominal:      General: There is no distension.      Palpations: Abdomen is soft.      Tenderness: There is no abdominal tenderness.      Comments: No rash, no rigidity   Musculoskeletal:      Cervical back: Full passive range of motion without pain and neck supple.      Comments: No calf tenderness or swelling b/l   Skin:     General: Skin is warm.      Findings: No rash.   Neurological:      General: No focal deficit present.      Mental Status: He is alert. Mental status is at baseline.      Comments: Fluent speech, no acute lateralizing deficits   Psychiatric:         Mood and Affect: Mood normal.         Behavior: Behavior normal.         Results     LAB:  All pertinent labs reviewed and interpreted  Labs Ordered and Resulted from Time of ED Arrival to Time of ED Departure   COMPREHENSIVE METABOLIC PANEL - Abnormal       Result Value    Sodium 132 (*)     Potassium 5.9 (*)     Chloride 97 (*)     Carbon Dioxide (CO2) 16 (*)     Anion Gap 19 (*)     Urea Nitrogen 99.4 (*)     Creatinine 12.36 (*)     Calcium 9.3      Glucose 100 (*)     Alkaline Phosphatase 1,870 (*)      (*)      (*)     Protein Total 7.4      Albumin 3.3 (*)     Bilirubin Total 4.0 (*)     GFR Estimate 4 (*)    LIPASE - Abnormal    Lipase 841 (*)    ROUTINE UA WITH MICROSCOPIC REFLEX TO CULTURE - Abnormal    Color Urine Laura (*)     Appearance Urine Turbid (*)     Glucose Urine 50 (*)     Bilirubin Urine 0.5 mg/dL (*)     Ketones Urine Negative      Specific Gravity Urine 1.018      Blood Urine 0.2 mg/dL (*)     pH  Urine 5.0      Protein Albumin Urine 100 (*)     Urobilinogen Urine <2.0      Nitrite Urine Negative      Leukocyte Esterase Urine 75 Kev/uL (*)     Bacteria Urine Few (*)     Mucus Urine Present (*)     RBC Urine 0      WBC Urine 28 (*)     Squamous Epithelials Urine 1      Transitional Epithelials Urine <1      Granular Casts Urine 52 (*)    BILIRUBIN DIRECT - Abnormal    Bilirubin Direct 2.85 (*)    CBC WITH PLATELETS AND DIFFERENTIAL - Abnormal    WBC Count 16.1 (*)     RBC Count 4.08 (*)     Hemoglobin 12.7 (*)     Hematocrit 38.7 (*)     MCV 95      MCH 31.1      MCHC 32.8      RDW 14.8      Platelet Count 272      % Neutrophils 89      % Lymphocytes 5      % Monocytes 5      % Eosinophils 0      % Basophils 0      % Immature Granulocytes 1      NRBCs per 100 WBC 0      Absolute Neutrophils 14.3 (*)     Absolute Lymphocytes 0.8      Absolute Monocytes 0.8      Absolute Eosinophils 0.0      Absolute Basophils 0.0      Absolute Immature Granulocytes 0.1      Absolute NRBCs 0.0     POTASSIUM - Abnormal    Potassium 5.8 (*)    POTASSIUM - Abnormal    Potassium 6.1 (*)    GLUCOSE BY METER - Abnormal    GLUCOSE BY METER POCT 182 (*)    ACETAMINOPHEN LEVEL - Abnormal    Acetaminophen <5.0 (*)    INR - Abnormal    INR 1.24 (*)    GLUCOSE BY METER - Abnormal    GLUCOSE BY METER POCT 167 (*)    BASIC METABOLIC PANEL - Abnormal    Sodium 132 (*)     Potassium 5.9 (*)     Chloride 97 (*)     Carbon Dioxide (CO2) 16 (*)     Anion Gap 19 (*)     Urea Nitrogen 99.4 (*)     Creatinine 12.36 (*)     Calcium 9.3      Glucose 100 (*)     GFR Estimate 4 (*)    INR - Abnormal    INR 1.28 (*)    GLUCOSE BY METER - Abnormal    GLUCOSE BY METER POCT 148 (*)    GLUCOSE BY METER - Abnormal    GLUCOSE BY METER POCT 130 (*)    PARTIAL THROMBOPLASTIN TIME - Normal    aPTT 32     ETHYL ALCOHOL LEVEL - Normal    Alcohol ethyl <0.01     GLUCOSE MONITOR NURSING POCT   GLUCOSE MONITOR NURSING POCT   GLUCOSE MONITOR NURSING POCT   ACUTE  HEPATITIS PANEL   ANTI NUCLEAR BRAYAN IGG BY IFA WITH REFLEX   COMPLEMENT C4   COMPLEMENT C3   COMPLEMENT ACTIVITY TOTAL (CH50)   URINE CULTURE       RADIOLOGY:  CT Abdomen Pelvis w/o Contrast   Final Result   IMPRESSION:    1.  Circumferential urinary bladder wall thickening could be secondary to underdistention or cystitis. Correlate with urinalysis.       2.  Sequelae of chronic biliary stricture in the left hepatic lobe with associated mild intrahepatic biliary ductal dilatation and parenchymal atrophy.                          ECG:  NSR, rate 68, no acute ischemia    I have independently reviewed and interpreted the EKG(s) documented above     PROCEDURES:  Procedures:  none      FINAL IMPRESSION:    ICD-10-CM    1. Biliary stricture  K83.1 Case Request: ENDOSCOPIC RETROGRADE CHOLANGIOPANCREATOGRAPHY     Case Request: ENDOSCOPIC RETROGRADE CHOLANGIOPANCREATOGRAPHY      2. Acute renal failure, unspecified acute renal failure type (H)  N17.9       3. Abnormal liver enzymes  R74.8       4. RUQ abdominal pain  R10.11       5. Nausea and vomiting, unspecified vomiting type  R11.2         CRITICAL CARE  60 minutes of critical care time spent managing KATHY, hepatic disease.  Time spent interpreting labs, speaking with consultants/patient, documenting.  Independent of any procedures performed.      I, Nemo Irizarry, am serving as a scribe to document services personally performed by Dr. Toni Luong, based on my observations and the provider's statements to me. I, Toni Luong, DO attest that Nemo Irizarry is acting in a scribe capacity, has observed my performance of the services and has documented them in accordance with my direction.      Toni Luong DO  Emergency Medicine  [facility] EMERGENCY DEPARTMENT  7/21/2023  1:08 PM        Toni Luong MD  07/21/23 1921

## 2023-07-21 NOTE — CONSULTS
[facility] consult     Name: Rafael Santillan    Medical Record #: [id number]    YOB: 1955    Date/Time: 7/21/2023/4:08 PM    Reason for Consultation: Toni Luong MD has asked me to evaluate Rafael Santillan regarding elevated LFTs and biliary  Stricture.    HPI: Patient is a 68 years old male with coronary artery disease history of NSTEMI, s/p appendectomy, s/p cholecystectomy with known history of biliary stricture has had ERCP with stent placement in the past.  He had right upper quadrant pain associate with nausea and vomiting and unable to keep things down for 2 weeks.  He had labs done which showed elevated LFTs.  He had a CT scan done at outside facility earlier this week showing s/p cholecystectomy, diffuse dilatation of intrahepatic and ex hepatic biliary ducts.  Common bile duct measuring up to 10 mm.  There is mild upper abdominal lymphadenopathy measuring 1.7 x 1.5 cm pancreatic duct normal caliber.  For past 2 days he has been having ongoing nausea and vomiting.  Unable to keep things down.  No fever or chills.  No excessive Tylenol or alcohol use.  He was also noted to have elevated BUN and creatinine.  Review of Systems (ROS): Complete ROS otherwise negative except for as above.    Past Medical History:  No past medical history on file.    Medications:   Outpatient medication: Aspirin 81 mg, Plavix 75 mg, Cozaar 50 mg Toprol-XL 25 mg, pantoprazole 40 mg, Crestor 40 mg, --- all daily, Nitrostat 0.4 mg as needed    Current Facility-Administered Medications:      dextrose 10% BOLUS, 300 mL, Intravenous, Once, Toni Luong MD, Last Rate: 75 mL/hr at 07/21/23 1528, 300 mL at 07/21/23 1528     glucose gel 15-30 g, 15-30 g, Oral, Q15 Min PRN **OR** dextrose 50 % injection 25-50 mL, 25-50 mL, Intravenous, Q15 Min PRN **OR** glucagon injection 1 mg, 1 mg, Subcutaneous, Q15 Min PRN, Toni Luong MD     sodium bicarbonate 150 mEq in D5W 1,000 mL infusion, , Intravenous,  "Linda Mathews Maryyam, MD  No current outpatient medications on file.       Allergies: Patient has no known allergies.    Family History:  No family history on file.    Social History:  Social History     Socioeconomic History     Marital status: Single     Spouse name: Not on file     Number of children: Not on file     Years of education: Not on file     Highest education level: Not on file   Occupational History     Not on file   Tobacco Use     Smoking status: Not on file     Smokeless tobacco: Not on file   Substance and Sexual Activity     Alcohol use: Not on file     Drug use: Not on file     Sexual activity: Not on file   Other Topics Concern     Not on file   Social History Narrative     Not on file     Social Determinants of Health     Financial Resource Strain: Not on file   Food Insecurity: Not on file   Transportation Needs: Not on file   Physical Activity: Not on file   Stress: Not on file   Social Connections: Not on file   Intimate Partner Violence: Not on file   Housing Stability: Not on file       Physical Exam: /58   Pulse 61   Temp 97.9  F (36.6  C) (Temporal)   Resp 21   Ht 1.778 m (5' 10\")   Wt 93 kg (205 lb)   SpO2 99%   BMI 29.41 kg/m      General: No acute distress  Eyes: Scleral icterus present.  Oropharynx: Moist, no ulcers or lesions  Neck/Thyroid: No neck masses or thyromegaly  Pulmonary: Lungs are clear to auscultation bilaterally  Cardiovascular: Regular, rate, rhythm   Gastrointestinal: Soft, non-tedner, soft, non-tender, no rebound or guarding. No masses palpable.Positive bowel sounds,  Skin: The patient is not jaundiced. No obvious rashes  Extremities: No pre-tibial edema, no clubbing.   Neurologic: Alert and oriented ×3 , non- focal, grossly intact.    Labs:    CBC RESULTS:   Recent Labs   Lab Test 07/21/23  1313   WBC 16.1*   RBC 4.08*   HGB 12.7*   HCT 38.7*   MCV 95   MCH 31.1   MCHC 32.8   RDW 14.8           CMP Results:   Recent Labs   Lab Test " 07/21/23  1531 07/21/23  1313   NA  --  132*   POTASSIUM  --  5.9*   CHLORIDE  --  97*   CO2  --  16*   ANIONGAP  --  19*   * 100*   BUN  --  99.4*   CR  --  12.36*   BILITOTAL  --  4.0*   ALKPHOS  --  1,870*   ALT  --  311*   AST  --  339*        INR Results: No results for input(s): INR in the last 09137 hours.       Radiology: CT Abdomen Pelvis w/o Contrast    Result Date: 7/21/2023  EXAM: CT ABDOMEN PELVIS W/O CONTRAST LOCATION: [facility] DATE: 7/21/2023 INDICATION: right side abd pain, vomiting for 2 weeks COMPARISON: 06/20/2010 TECHNIQUE: CT scan of the abdomen and pelvis was performed without IV contrast. Multiplanar reformats were obtained. Dose reduction techniques were used. CONTRAST: None. FINDINGS: LOWER CHEST: Partially imaged coronary artery calcifications HEPATOBILIARY: Cholecystectomy. Progressive atrophy of the left hepatic lobe with some mild intrahepatic biliary dilation PANCREAS: Normal. SPLEEN: Normal. ADRENAL GLANDS: Normal. KIDNEYS/BLADDER: No significant mass, stone, or hydronephrosis. Circumferential bladder wall thickening. BOWEL: Diverticulosis of the colon. No acute inflammatory change. No obstruction. LYMPH NODES: Normal. VASCULATURE: Minimal atherosclerotic calcification. No aneurysm. PELVIC ORGANS: Enlarged prostate. MUSCULOSKELETAL: Fat-containing right inguinal hernia. Right lower quadrant spigelian hernia containing nonobstructed small bowel. Mild degenerative changes in the spine.     IMPRESSION: 1.  Circumferential urinary bladder wall thickening could be secondary to underdistention or cystitis. Correlate with urinalysis. 2.  Sequelae of chronic biliary stricture in the left hepatic lobe with associated mild intrahepatic biliary ductal dilatation and parenchymal atrophy.   ERCP on 1/23/2013 showing  Impressions/Post-Op Diagnosis:        - Three stents were removed from the biliary tree.        - A moderate localized biliary stricture was found.  The stricture was        post-surgical, benign appearing and fibrotic. Good flow of contrast was        noted readily across the patent lumen of the stricture. The stricture        was biopsied.        - Choledocholithiasis was found. Complete removal was accomplished by        sweeping.                                                                                   Recommendation:        - Perform MRCP in 3 months.        - Check liver enzymes (AST, ALT, Alkaline Phosphatase,   Bilirubin) at      appointment to be scheduled.        - Observe patient's clinical course.        - Watch for pancreatitis, bleeding, perforation, and cholangitis.     MRCP on 4/15/2013 showing  1. Presumed proximal biliary stricture left lateral segment, with associated mild to moderate intrahepatic biliary ductal dilatation and atrophy which should be related to long-standing biliary obstruction.   2. Mild short segment stricture common hepatic duct just below a normal variant trifurcation.   3. No evidence of retained stone.   4. Fatty liver.    Impression:   68 years old male with history of coronary artery disease s/p stenting, NSTEMI, s/p cholecystectomy, known history of biliary strictures s/p ERCPs with stenting in the past.  Elevated LFTs with outside CT showing bili ductal dilatation, this was a contrast study.  Today's noncontrast study showing intrahepatic biliary ductal dilatation.  This is likely from his previous biliary stricture.  Elevated WBC count is concerning for an early infection.  No obvious cholangitis.  Acute hepatitis panel and Tylenol level pending  Nausea vomiting.  This could be related to his biliary process however acute renal failure could be contributing to this nausea vomiting.  Acute renal failure.  Etiology unclear.  Nephrology service consulted.  Elevated lipase.      Recommendation:   Hold Plavix today.  Start antibiotics.  Plan to proceed with ERCP tomorrow, discussed with on call biliary MD.  If  patient has signs and symptoms of cholangitis then will need to do ERCP earlier.  Await nephrology recommendations.  GI team will follow.  Total time spent was 50 minutes .                                              Yaniv Yeh M.D.  Thank you for the opportunity to participate in the care of this patient.   Please feel free to call me with any questions or concerns.  Phone number [phone].

## 2023-07-21 NOTE — CONSULTS
NEPHROLOGY CONSULTATION      ASSESSMENT/PLAN:  68 year old male with a past medical history of coronary artery disease s/p angiogram 3/20/2023 with PCI has been on aspirin and Plavix, history of GERD on omeprazole, HLD, history of chronic biliary strictures last procedure in 2013 at [facility], history of cholecystectomy complicated by injury of bile duct and recurrent strictures, history of hypothyroidism, baseline mild CKD 3, last known normal labs at baseline 2 weeks ago with respect to kidney function although they were significant for ongoing liver injury.  Presented with ongoing symptoms of nausea vomiting and feeling fatigued and poorly was noted to have an acutely elevated creatinine of 12.36 with significantly elevated liver enzymes, imaging was significant for biliary dilatation and concerns for recurrent obstruction.  Nephrology consulted for acute kidney injury    KATHY  Baseline CKD 3 with creatinine around 1.2-1.5 with GFR around 50-55  Baseline UA with mild proteins otherwise bland although current UA does appear consistent with ATN with granular casts WBCs RBCs and proteinuria  Urine proteins ordered for quantification  Imaging without any abnormality with respect to kidneys, except urinary bladder wall thickening consistent with possible chronic obstruction or cystitis, prior imaging have not have bladder wall thickening, also showing sequelae of chronic biliary stricture with mild intrahepatic biliary dilatation and parenchymal atrophy  Current KATHY appears possibly related to severe hepatic injury, possible HRS, timeframe to injury would be hard to attribute to biliary cast nephropathy.  Patient did receive contrast for CT scan in the past 2 weeks at [facility], timeframe would match for contrast nephropathy especially in light of underlying severe biliary obstruction and hepatic injury  --> We will order hepatic panel to rule out any acute hepatitis etiology   We will also order complements, NIK to rule  out any additional etiologies for acute renal failure  Further serological work-up if any suggestive signs or labs from work-up  --> Manage conservatively for now BMP ordered every 12  Started on IV fluids isotonic bicarb at 100 mill per hour  --> Follow on ARUN and daily weights  Patient was counseled, with severe renal injury if he does not turn around or worsens anymore any acute indication of dialysis may need to consider initiating on dialysis    Hyperkalemia  Likely secondary to acute renal failure and hepatic injury  We will correct acidosis follow potassiums s/p shifting  --> Received 1 amp of bicarb, follow serial potassiums on bicarb infusion    Metabolic acidosis  Started on isotonic bicarb received 1 amp of bicarb  Follow on BMPs every 12  labs consistent mostly with metabolic acidosis in the setting of KATHY    Mild hypotension  No history of hypertension blood pressure usually around 120s to 130s over 60s  No prior to admission antihypertensives documented  Clinically appears mild volume depleted  --> Isotonic bicarb as above has received normal saline bolus prior    Mild anemia  Appears hemoconcentrated possibly will downtrend with IV fluids  Follow closely  Likely inflammatory    Leukocytosis  With ongoing biliary obstruction concerns for ascending cholangitis started on antibiotics as per GI  We will follow closely continue IV fluids as above    History of chronic biliary stricture  History of cholecystectomy with known biliary stricture possibly secondary to bile duct injury  History of ERCP with stent placement last procedure was around 2013   Had a earlier CT scan in the last 2 weeks done with contrast that did not show dilatation of intrahepatic and extrahepatic biliary ducts as well as lymphadenopathy which would be concerning for cholangitis  Plans for ERCP tomorrow  Plavix held for procedure tomorrow  On Zosyn     GERD continue on IV Protonix    History of mitral regurg   Last evaluation with  "cardiology appears to have prolapse of posterior mitral valve leaflet  Last echo with stable EF    History of coronary artery disease s/p PCI in March 2023  On aspirin and Plavix   We will need to hold Plavix per GI  High risk for platelet dysfunction with severe renal failure, will need to monitor for bleed    Patient will be n.p.o. for his GI procedures, will follow closely on renal functions with IV fluids high risk for needing dialysis    Thank you for the consultation  We will follow  Hamilton Mckeon MD  [facility]  [phone]    CC: KATHY    REASON FOR CONSULTATION: We are asked to see pt by Toni Luong MD    HISTORY OF PRESENT ILLNESS:68 year old male  68 year old male with a past medical history of coronary artery disease s/p angiogram 3/20/2023 with PCI has been on aspirin and Plavix, history of GERD on omeprazole, HLD, history of chronic biliary strictures last procedure in 2013 at [facility], history of cholecystectomy complicated by injury of bile duct and recurrent strictures, history of hypothyroidism, baseline mild CKD 3, last known normal labs at baseline 2 weeks ago with respect to kidney function although they were significant for ongoing liver injury.  Presented with ongoing symptoms of nausea vomiting and feeling fatigued and poorly was noted to have an acutely elevated creatinine of 12.36 with significantly elevated liver enzymes, imaging was significant for biliary dilatation and concerns for recurrent obstruction.  Nephrology consulted for acute kidney injury  The patient reports feeling unwell for about 2 days with significant nausea and vomiting prior to that was able to tolerate some eating and drinking.  Reported to the ER because he was tired of feeling like \"crap\"  Seen with GI specialist at bedside, he is well-known to the GI team for his history of recurrent biliary strictures and obstruction requiring ERCP, last procedure over 10 years ago  He had some work-up with the " primary care on 7/7/2023 with labs showing stable kidney functions although liver function was significantly elevated which was followed by a CT scan with contrast that showed significant biliary stricture with intrahepatic and expected bile duct dilatation with CBD more than 10 mm and abdominal lymphadenopathy.  He was advised for following up with the GI team on 7/19/2023 the patient reported to the ER today and is planned for ERCP procedure tomorrow  Denies any intake of any NSAIDs or nephrotoxic medication does not take Tylenol for pain  He does report that he has been noticing his urine output to be low and much darker over the last 2 to 3 days  Besides receiving contrast for his additional work-up for abnormal liver function has not received any other nephrotoxic medications  Blood pressure also remains at baseline without any change    REVIEW OF SYSTEMS:  ROS was completely reviewed and otherwise negative and non-contributory    No past medical history on file.    Social History     Socioeconomic History     Marital status: Single     Spouse name: Not on file     Number of children: Not on file     Years of education: Not on file     Highest education level: Not on file   Occupational History     Not on file   Tobacco Use     Smoking status: Not on file     Smokeless tobacco: Not on file   Substance and Sexual Activity     Alcohol use: Not on file     Drug use: Not on file     Sexual activity: Not on file   Other Topics Concern     Not on file   Social History Narrative     Not on file     Social Determinants of Health     Financial Resource Strain: Not on file   Food Insecurity: Not on file   Transportation Needs: Not on file   Physical Activity: Not on file   Stress: Not on file   Social Connections: Not on file   Intimate Partner Violence: Not on file   Housing Stability: Not on file       No family history on file.    No Known Allergies    MEDICATIONS:    piperacillin-tazobactam  3.375 g Intravenous Once  "        PHYSICAL EXAM    /58   Pulse 61   Temp 97.9  F (36.6  C) (Temporal)   Resp 21   Ht 1.778 m (5' 10\")   Wt 93 kg (205 lb)   SpO2 99%   BMI 29.41 kg/m      No intake or output data in the 24 hours ending 07/21/23 1640    Alert/oriented x 3; awake and NAD  HEENT NC/AT; perrla; OP clear without lesions; mmm  Neck supple without LAD, TM  CV; RRR without rub or murmur  Lung: clear and equal; no extra sounds  Ab: soft and NT; not distended; normal bs  Ext: no edema and well perfused  Skin; no rash  Neuro; grossly intact    LABORATORIES    Recent Labs   Lab 07/21/23  1313   WBC 16.1*   HGB 12.7*   HCT 38.7*        Recent Labs   Lab 07/21/23  1313   *   CO2 16*   BUN 99.4*   ALKPHOS 1,870*   *   *     Recent Labs   Lab 07/21/23  1506   INR 1.24*   PTT 32     Invalid input(s): FERRITIN  No results for input(s): IRON in the last 168 hours.    Invalid input(s): TIBC    I reviewed all labs and imaging    Thank you for the consultation we will follow    Hamilton Mckeon MD  [facility]  [phone]      "

## 2023-07-21 NOTE — LETTER
[place] [address]  [address] [facility] [address]  Phone: [phone]  Fax: [phone]    July 26, 2023        Rafael Santillan  [address] [address]      To whom it may concern:    RE: Rafael Santillan was hospitalized at [facility] on July 21-26, 2023.  We recommended he will not return to work till 8/7/23 or till seen in a follow-up visit by nephrology.    Please contact me for questions or concerns.    Sincerely,  Rebeca Hernandez MD   Hospital Medicine Service   [phone]   Pager [phone]

## 2023-07-21 NOTE — CONSULTS
"Care Management Initial Consult    General Information  Assessment completed with: Spouse or significant other, Kinsey significant other, patient sleeping during questions  Type of CM/SW Visit: Initial Assessment    Primary Care Provider verified and updated as needed: Yes   Readmission within the last 30 days: no previous admission in last 30 days      Reason for Consult: discharge planning  Advance Care Planning: Advance Care Planning Reviewed: no concerns identified          Communication Assessment  Patient's communication style: spoken language (English or Bilingual)             Cognitive  Cognitive/Neuro/Behavioral: WDL                      Living Environment:   People in home: significant other  Rafael and significant other Kinsey  Current living Arrangements: house      Able to return to prior arrangements: yes       Family/Social Support:  Care provided by: self  Provides care for: no one  Marital Status: Lives with Significant Other  Significant Other       Kinsey  Description of Support System: Supportive, Involved    Support Assessment: Adequate family and caregiver support, Adequate social supports    Current Resources:   Patient receiving home care services: No     Community Resources: None  Equipment currently used at home: none  Supplies currently used at home: Other (\"upper dentures, glasses\")    Employment/Financial:  Employment Status: employed full-time     Employment/ Comments: \"no  history\"  Financial Concerns:     Referral to Financial Worker: No  Finance Comments: His primary insurance is [employer] since he is still working.    Does the patient's insurance plan have a 3 day qualifying hospital stay waiver?  No    Lifestyle & Psychosocial Needs:  Social Determinants of Health     Tobacco Use: Not on file   Alcohol Use: Not on file   Financial Resource Strain: Not on file   Food Insecurity: Not on file   Transportation Needs: Not on file   Physical Activity: Not on file "   Stress: Not on file   Social Connections: Not on file   Intimate Partner Violence: Not on file   Depression: Not on file   Housing Stability: Not on file       Functional Status:  Prior to admission patient needed assistance:   Dependent ADLs:: Independent, Ambulation-no assistive device  Dependent IADLs:: Independent  Assesssment of Functional Status: At functional baseline    Mental Health Status:          Chemical Dependency Status:                Values/Beliefs:  Spiritual, Cultural Beliefs, [employer] Practices, Values that affect care:                 Additional Information:  Rafael lives in a house with his significant other Kinsey. He is independent with ADLs and IADLs. He drives and works full time.    Likely home with no new needs at this time.    Family to transport at discharge.    CM to follow for medical progression of care, discharge recommendations, and final discharge plan.    Jaqueline Kirk RN

## 2023-07-22 ENCOUNTER — ANESTHESIA (OUTPATIENT)
Dept: SURGERY | Facility: HOSPITAL | Age: 68
End: 2023-07-22
Payer: COMMERCIAL

## 2023-07-22 ENCOUNTER — APPOINTMENT (OUTPATIENT)
Dept: RADIOLOGY | Facility: HOSPITAL | Age: 68
End: 2023-07-22
Attending: INTERNAL MEDICINE
Payer: COMMERCIAL

## 2023-07-22 LAB
ALBUMIN SERPL BCG-MCNC: 2.6 G/DL (ref 3.5–5.2)
ALP SERPL-CCNC: 1584 U/L (ref 40–129)
ALT SERPL W P-5'-P-CCNC: 274 U/L (ref 0–70)
ANION GAP SERPL CALCULATED.3IONS-SCNC: 19 MMOL/L (ref 7–15)
AST SERPL W P-5'-P-CCNC: 309 U/L (ref 0–45)
BILIRUB SERPL-MCNC: 4.2 MG/DL
BUN SERPL-MCNC: 106 MG/DL (ref 8–23)
CALCIUM SERPL-MCNC: 8.7 MG/DL (ref 8.8–10.2)
CHLORIDE SERPL-SCNC: 92 MMOL/L (ref 98–107)
CREAT SERPL-MCNC: 12.93 MG/DL (ref 0.67–1.17)
DEPRECATED HCO3 PLAS-SCNC: 19 MMOL/L (ref 22–29)
ERCP: NORMAL
ERYTHROCYTE [DISTWIDTH] IN BLOOD BY AUTOMATED COUNT: 14.6 % (ref 10–15)
GFR SERPL CREATININE-BSD FRML MDRD: 4 ML/MIN/1.73M2
GLUCOSE BLDC GLUCOMTR-MCNC: 88 MG/DL (ref 70–99)
GLUCOSE SERPL-MCNC: 111 MG/DL (ref 70–99)
HAV IGM SERPL QL IA: NONREACTIVE
HBV CORE IGM SERPL QL IA: NONREACTIVE
HBV SURFACE AG SERPL QL IA: NONREACTIVE
HCT VFR BLD AUTO: 34.5 % (ref 40–53)
HCV AB SERPL QL IA: NONREACTIVE
HGB BLD-MCNC: 11.5 G/DL (ref 13.3–17.7)
INR PPP: 1.27 (ref 0.85–1.15)
MCH RBC QN AUTO: 30.9 PG (ref 26.5–33)
MCHC RBC AUTO-ENTMCNC: 33.3 G/DL (ref 31.5–36.5)
MCV RBC AUTO: 93 FL (ref 78–100)
PLATELET # BLD AUTO: 262 10E3/UL (ref 150–450)
POTASSIUM SERPL-SCNC: 5.5 MMOL/L (ref 3.4–5.3)
PROT SERPL-MCNC: 6.2 G/DL (ref 6.4–8.3)
RBC # BLD AUTO: 3.72 10E6/UL (ref 4.4–5.9)
SODIUM SERPL-SCNC: 130 MMOL/L (ref 136–145)
WBC # BLD AUTO: 11.7 10E3/UL (ref 4–11)

## 2023-07-22 PROCEDURE — 255N000002 HC RX 255 OP 636: Performed by: INTERNAL MEDICINE

## 2023-07-22 PROCEDURE — 99233 SBSQ HOSP IP/OBS HIGH 50: CPT | Performed by: INTERNAL MEDICINE

## 2023-07-22 PROCEDURE — 36415 COLL VENOUS BLD VENIPUNCTURE: CPT | Performed by: INTERNAL MEDICINE

## 2023-07-22 PROCEDURE — 88305 TISSUE EXAM BY PATHOLOGIST: CPT | Mod: 26 | Performed by: PATHOLOGY

## 2023-07-22 PROCEDURE — 0F798DZ DILATION OF COMMON BILE DUCT WITH INTRALUMINAL DEVICE, VIA NATURAL OR ARTIFICIAL OPENING ENDOSCOPIC: ICD-10-PCS | Performed by: INTERNAL MEDICINE

## 2023-07-22 PROCEDURE — 278N000051 HC OR IMPLANT GENERAL: Performed by: INTERNAL MEDICINE

## 2023-07-22 PROCEDURE — 258N000003 HC RX IP 258 OP 636: Performed by: INTERNAL MEDICINE

## 2023-07-22 PROCEDURE — C1726 CATH, BAL DIL, NON-VASCULAR: HCPCS | Performed by: INTERNAL MEDICINE

## 2023-07-22 PROCEDURE — 88112 CYTOPATH CELL ENHANCE TECH: CPT | Mod: TC | Performed by: INTERNAL MEDICINE

## 2023-07-22 PROCEDURE — 88341 IMHCHEM/IMCYTCHM EA ADD ANTB: CPT | Mod: 26 | Performed by: PATHOLOGY

## 2023-07-22 PROCEDURE — 88305 TISSUE EXAM BY PATHOLOGIST: CPT | Mod: TC | Performed by: INTERNAL MEDICINE

## 2023-07-22 PROCEDURE — 250N000011 HC RX IP 250 OP 636: Mod: JZ | Performed by: INTERNAL MEDICINE

## 2023-07-22 PROCEDURE — 250N000013 HC RX MED GY IP 250 OP 250 PS 637: Performed by: INTERNAL MEDICINE

## 2023-07-22 PROCEDURE — 88360 TUMOR IMMUNOHISTOCHEM/MANUAL: CPT | Mod: 26 | Performed by: PATHOLOGY

## 2023-07-22 PROCEDURE — 272N000001 HC OR GENERAL SUPPLY STERILE: Performed by: INTERNAL MEDICINE

## 2023-07-22 PROCEDURE — 250N000009 HC RX 250: Performed by: INTERNAL MEDICINE

## 2023-07-22 PROCEDURE — C1769 GUIDE WIRE: HCPCS | Performed by: INTERNAL MEDICINE

## 2023-07-22 PROCEDURE — 360N000082 HC SURGERY LEVEL 2 W/ FLUORO, PER MIN: Performed by: INTERNAL MEDICINE

## 2023-07-22 PROCEDURE — 258N000003 HC RX IP 258 OP 636: Performed by: NURSE ANESTHETIST, CERTIFIED REGISTERED

## 2023-07-22 PROCEDURE — C9113 INJ PANTOPRAZOLE SODIUM, VIA: HCPCS | Mod: JZ | Performed by: INTERNAL MEDICINE

## 2023-07-22 PROCEDURE — 250N000009 HC RX 250: Performed by: NURSE ANESTHETIST, CERTIFIED REGISTERED

## 2023-07-22 PROCEDURE — 0FB98ZX EXCISION OF COMMON BILE DUCT, VIA NATURAL OR ARTIFICIAL OPENING ENDOSCOPIC, DIAGNOSTIC: ICD-10-PCS | Performed by: INTERNAL MEDICINE

## 2023-07-22 PROCEDURE — 85610 PROTHROMBIN TIME: CPT | Performed by: INTERNAL MEDICINE

## 2023-07-22 PROCEDURE — 99232 SBSQ HOSP IP/OBS MODERATE 35: CPT | Performed by: INTERNAL MEDICINE

## 2023-07-22 PROCEDURE — 88342 IMHCHEM/IMCYTCHM 1ST ANTB: CPT | Mod: 26 | Performed by: PATHOLOGY

## 2023-07-22 PROCEDURE — 0FC98ZZ EXTIRPATION OF MATTER FROM COMMON BILE DUCT, VIA NATURAL OR ARTIFICIAL OPENING ENDOSCOPIC: ICD-10-PCS | Performed by: INTERNAL MEDICINE

## 2023-07-22 PROCEDURE — 999N000180 XR SURGERY CARM FLUORO LESS THAN 5 MIN

## 2023-07-22 PROCEDURE — 120N000001 HC R&B MED SURG/OB

## 2023-07-22 PROCEDURE — 250N000011 HC RX IP 250 OP 636: Mod: JZ | Performed by: NURSE ANESTHETIST, CERTIFIED REGISTERED

## 2023-07-22 PROCEDURE — 85027 COMPLETE CBC AUTOMATED: CPT | Performed by: INTERNAL MEDICINE

## 2023-07-22 PROCEDURE — 370N000017 HC ANESTHESIA TECHNICAL FEE, PER MIN: Performed by: INTERNAL MEDICINE

## 2023-07-22 PROCEDURE — 80053 COMPREHEN METABOLIC PANEL: CPT | Performed by: INTERNAL MEDICINE

## 2023-07-22 PROCEDURE — 88112 CYTOPATH CELL ENHANCE TECH: CPT | Mod: 26 | Performed by: PATHOLOGY

## 2023-07-22 PROCEDURE — 999N000141 HC STATISTIC PRE-PROCEDURE NURSING ASSESSMENT: Performed by: INTERNAL MEDICINE

## 2023-07-22 DEVICE — BILIARY STENT WITH NAVIFLEXTM RX DELIVERY SYSTEM
Type: IMPLANTABLE DEVICE | Site: BILE DUCT | Status: FUNCTIONAL
Brand: ADVANIX™ BILIARY

## 2023-07-22 RX ORDER — FLUMAZENIL 0.1 MG/ML
0.2 INJECTION, SOLUTION INTRAVENOUS
Status: ACTIVE | OUTPATIENT
Start: 2023-07-22 | End: 2023-07-23

## 2023-07-22 RX ORDER — OXYCODONE HYDROCHLORIDE 5 MG/1
5 TABLET ORAL
Status: DISCONTINUED | OUTPATIENT
Start: 2023-07-22 | End: 2023-07-22 | Stop reason: HOSPADM

## 2023-07-22 RX ORDER — ONDANSETRON 2 MG/ML
4 INJECTION INTRAMUSCULAR; INTRAVENOUS EVERY 6 HOURS PRN
Status: DISCONTINUED | OUTPATIENT
Start: 2023-07-22 | End: 2023-07-26 | Stop reason: HOSPADM

## 2023-07-22 RX ORDER — NALOXONE HYDROCHLORIDE 0.4 MG/ML
0.4 INJECTION, SOLUTION INTRAMUSCULAR; INTRAVENOUS; SUBCUTANEOUS
Status: DISCONTINUED | OUTPATIENT
Start: 2023-07-22 | End: 2023-07-26 | Stop reason: HOSPADM

## 2023-07-22 RX ORDER — NALOXONE HYDROCHLORIDE 0.4 MG/ML
0.2 INJECTION, SOLUTION INTRAMUSCULAR; INTRAVENOUS; SUBCUTANEOUS
Status: DISCONTINUED | OUTPATIENT
Start: 2023-07-22 | End: 2023-07-26 | Stop reason: HOSPADM

## 2023-07-22 RX ORDER — LIDOCAINE HYDROCHLORIDE 10 MG/ML
INJECTION, SOLUTION INFILTRATION; PERINEURAL PRN
Status: DISCONTINUED | OUTPATIENT
Start: 2023-07-22 | End: 2023-07-22

## 2023-07-22 RX ORDER — HALOPERIDOL 5 MG/ML
1 INJECTION INTRAMUSCULAR
Status: DISCONTINUED | OUTPATIENT
Start: 2023-07-22 | End: 2023-07-22 | Stop reason: HOSPADM

## 2023-07-22 RX ORDER — PROPOFOL 10 MG/ML
INJECTION, EMULSION INTRAVENOUS CONTINUOUS PRN
Status: DISCONTINUED | OUTPATIENT
Start: 2023-07-22 | End: 2023-07-22

## 2023-07-22 RX ORDER — FENTANYL CITRATE 50 UG/ML
25 INJECTION, SOLUTION INTRAMUSCULAR; INTRAVENOUS EVERY 5 MIN PRN
Status: DISCONTINUED | OUTPATIENT
Start: 2023-07-22 | End: 2023-07-22 | Stop reason: HOSPADM

## 2023-07-22 RX ORDER — FENTANYL CITRATE 50 UG/ML
50 INJECTION, SOLUTION INTRAMUSCULAR; INTRAVENOUS EVERY 5 MIN PRN
Status: DISCONTINUED | OUTPATIENT
Start: 2023-07-22 | End: 2023-07-22 | Stop reason: HOSPADM

## 2023-07-22 RX ORDER — SODIUM CHLORIDE 9 MG/ML
INJECTION, SOLUTION INTRAVENOUS CONTINUOUS PRN
Status: DISCONTINUED | OUTPATIENT
Start: 2023-07-22 | End: 2023-07-22

## 2023-07-22 RX ORDER — GUAIFENESIN/DEXTROMETHORPHAN 100-10MG/5
10 SYRUP ORAL EVERY 4 HOURS PRN
Status: DISCONTINUED | OUTPATIENT
Start: 2023-07-22 | End: 2023-07-26 | Stop reason: HOSPADM

## 2023-07-22 RX ORDER — ONDANSETRON 2 MG/ML
4 INJECTION INTRAMUSCULAR; INTRAVENOUS EVERY 30 MIN PRN
Status: DISCONTINUED | OUTPATIENT
Start: 2023-07-22 | End: 2023-07-22 | Stop reason: HOSPADM

## 2023-07-22 RX ORDER — ONDANSETRON 2 MG/ML
INJECTION INTRAMUSCULAR; INTRAVENOUS PRN
Status: DISCONTINUED | OUTPATIENT
Start: 2023-07-22 | End: 2023-07-22

## 2023-07-22 RX ORDER — PROPOFOL 10 MG/ML
INJECTION, EMULSION INTRAVENOUS PRN
Status: DISCONTINUED | OUTPATIENT
Start: 2023-07-22 | End: 2023-07-22

## 2023-07-22 RX ORDER — ONDANSETRON 4 MG/1
4 TABLET, ORALLY DISINTEGRATING ORAL EVERY 30 MIN PRN
Status: DISCONTINUED | OUTPATIENT
Start: 2023-07-22 | End: 2023-07-22 | Stop reason: HOSPADM

## 2023-07-22 RX ORDER — SODIUM CHLORIDE, SODIUM LACTATE, POTASSIUM CHLORIDE, CALCIUM CHLORIDE 600; 310; 30; 20 MG/100ML; MG/100ML; MG/100ML; MG/100ML
INJECTION, SOLUTION INTRAVENOUS CONTINUOUS
Status: DISCONTINUED | OUTPATIENT
Start: 2023-07-22 | End: 2023-07-22 | Stop reason: HOSPADM

## 2023-07-22 RX ORDER — LIDOCAINE 40 MG/G
CREAM TOPICAL
Status: DISCONTINUED | OUTPATIENT
Start: 2023-07-22 | End: 2023-07-22 | Stop reason: HOSPADM

## 2023-07-22 RX ORDER — LABETALOL HYDROCHLORIDE 5 MG/ML
10 INJECTION, SOLUTION INTRAVENOUS
Status: DISCONTINUED | OUTPATIENT
Start: 2023-07-22 | End: 2023-07-22 | Stop reason: HOSPADM

## 2023-07-22 RX ORDER — OXYCODONE HYDROCHLORIDE 5 MG/1
10 TABLET ORAL
Status: DISCONTINUED | OUTPATIENT
Start: 2023-07-22 | End: 2023-07-22 | Stop reason: HOSPADM

## 2023-07-22 RX ORDER — GLYCOPYRROLATE 0.2 MG/ML
INJECTION, SOLUTION INTRAMUSCULAR; INTRAVENOUS PRN
Status: DISCONTINUED | OUTPATIENT
Start: 2023-07-22 | End: 2023-07-22

## 2023-07-22 RX ORDER — ONDANSETRON 4 MG/1
4 TABLET, ORALLY DISINTEGRATING ORAL EVERY 6 HOURS PRN
Status: DISCONTINUED | OUTPATIENT
Start: 2023-07-22 | End: 2023-07-26 | Stop reason: HOSPADM

## 2023-07-22 RX ADMIN — PIPERACILLIN AND TAZOBACTAM 3.38 G: 3; .375 INJECTION, POWDER, LYOPHILIZED, FOR SOLUTION INTRAVENOUS at 01:46

## 2023-07-22 RX ADMIN — ONDANSETRON 4 MG: 2 INJECTION INTRAMUSCULAR; INTRAVENOUS at 14:10

## 2023-07-22 RX ADMIN — PIPERACILLIN AND TAZOBACTAM 3.38 G: 3; .375 INJECTION, POWDER, LYOPHILIZED, FOR SOLUTION INTRAVENOUS at 13:14

## 2023-07-22 RX ADMIN — LIDOCAINE HYDROCHLORIDE 5 ML: 10 INJECTION, SOLUTION INFILTRATION; PERINEURAL at 14:12

## 2023-07-22 RX ADMIN — PROPOFOL 20 MG: 10 INJECTION, EMULSION INTRAVENOUS at 14:14

## 2023-07-22 RX ADMIN — PROPOFOL 150 MCG/KG/MIN: 10 INJECTION, EMULSION INTRAVENOUS at 14:10

## 2023-07-22 RX ADMIN — SODIUM BICARBONATE: 84 INJECTION, SOLUTION INTRAVENOUS at 18:43

## 2023-07-22 RX ADMIN — GUAIFENESIN AND DEXTROMETHORPHAN 10 ML: 100; 10 SYRUP ORAL at 17:29

## 2023-07-22 RX ADMIN — SODIUM CHLORIDE: 9 INJECTION, SOLUTION INTRAVENOUS at 14:03

## 2023-07-22 RX ADMIN — GLYCOPYRROLATE 0.2 MG: 0.2 INJECTION INTRAMUSCULAR; INTRAVENOUS at 14:07

## 2023-07-22 RX ADMIN — SODIUM BICARBONATE: 84 INJECTION, SOLUTION INTRAVENOUS at 05:29

## 2023-07-22 RX ADMIN — PANTOPRAZOLE SODIUM 40 MG: 40 INJECTION, POWDER, FOR SOLUTION INTRAVENOUS at 08:45

## 2023-07-22 RX ADMIN — PROPOFOL 30 MG: 10 INJECTION, EMULSION INTRAVENOUS at 14:10

## 2023-07-22 RX ADMIN — LIDOCAINE HYDROCHLORIDE 5 ML: 10 INJECTION, SOLUTION INFILTRATION; PERINEURAL at 14:10

## 2023-07-22 ASSESSMENT — ACTIVITIES OF DAILY LIVING (ADL)
ADLS_ACUITY_SCORE: 22

## 2023-07-22 NOTE — PROGRESS NOTES
[facility]    Medicine Progress Note - Hospitalist Service    Date of Admission:  7/21/2023    Assessment & Plan      Rafael Santillan is a 68 year old male admitted on 7/21/2023. He presented with 2 weeks of off-and-on abdominal pain nausea and vomiting was found to have acute renal failure, hyperkalemia, elevated liver enzymes.  Nephrology and GI consulted.  Patient is n.p.o. for ERCP.  Started on bicarb drip.  UA is positive for leukocytes and therefore on empirical Zosyn.    Abdominal pain and elevated liver enzyme likely due to biliary stricture  --Elevated liver enzymes on admission-- mild improvement today.  --Patient has biliary stents.  --Acute hepatitis panel is pending  --ERCP pending today  --Continue empirical Zosyn  --Currently on bicarb drip    Acute renal failure with hyperkalemia.  Hyperkalemia is improving  --Nonoliguric  Metabolic acidosis  --Started on bicarb drip on 7/21.  No improvement today.  Defer initiation of dialysis to nephrology  --NIK is pending  --Baseline creatinine 1.3  Hold losartan, statin, aspirin, Plavix  -- Total CK is normal    GERD.    -Continue home medications  -Continue r IV Protonix    Hyponatremia likely due to hypovolemia  --Slightly worsened overnight  --Currently on bicarb drip    Leukocytosis likely reactive  --Improving  --Continue empirical Zosyn  Urine culture is still pending    History of mitral regurgitation  --Does not appear to be fluid overloaded  --Monitor fluid status every day to avoid hypervolemia       Diet: NPO per Anesthesia Guidelines for Procedure/Surgery Except for: Meds    DVT Prophylaxis: Low Risk/Ambulatory with no VTE prophylaxis indicated  Cordero Catheter: Not present  Lines: None     Cardiac Monitoring: ACTIVE order. Indication: Electrolyte Imbalance (24 hours)- Magnesium <1.3 mg/ml; Potassium < =2.8 or > 5.5 mg/ml  Code Status: Full Code      Clinically Significant Risk Factors Present on Admission        #  "Hyperkalemia: Highest K = 6.1 mmol/L in last 2 days, will monitor as appropriate    # Hypercalcemia: corrected calcium is >10.1, will monitor as appropriate   # Anion Gap Metabolic Acidosis: Highest Anion Gap = 19 mmol/L in last 2 days, will monitor and treat as appropriate  # Hypoalbuminemia: Lowest albumin = 2.6 g/dL at 7/22/2023  7:18 AM, will monitor as appropriate  # Coagulation Defect: INR = 1.27 (Ref range: 0.85 - 1.15) and/or PTT = 32 Seconds (Ref range: 22 - 38 Seconds), will monitor for bleeding  # Drug Induced Platelet Defect: home medication list includes an antiplatelet medication  # Acute Kidney Injury, unspecified: based on a >150% or 0.3 mg/dL increase in last creatinine compared to past 90 day average, will monitor renal function  # Hypertension: Home medication list includes antihypertensive(s)      # Obesity: Estimated body mass index is 31.09 kg/m  as calculated from the following:    Height as of this encounter: 1.778 m (5' 10\").    Weight as of this encounter: 98.3 kg (216 lb 11.2 oz).            Disposition Plan     Expected Discharge Date: 07/23/2023      Destination: home with family            Jian Jeronimo MD  Hospitalist Service  [facility]  Securely message with [employer] (more info)  Text page via [employer] Paging/Directory   ______________________________________________________________________    Interval History   No episodes of nausea or vomiting.  Abdominal pain is controlled.  BUN and creatinine worsening but potassium is improving.  Patient is n.p.o. for ERCP.    Physical Exam   Vital Signs: Temp: 97.8  F (36.6  C) Temp src: Oral BP: 128/69 Pulse: 62   Resp: 18 SpO2: 98 % O2 Device: None (Room air)    Weight: 216 lbs 11.2 oz urine output of 564    Moist mucous membranes  Bowel sounds are positive  Did not elicit tenderness due to patient's discomfort  Normal mood and affect      Medical Decision Making       35 MINUTES SPENT BY ME on the date of service doing " chart review, history, exam, documentation & further activities per the note.  MANAGEMENT DISCUSSED with the following over the past 24 hours: Patient   NOTE(S)/MEDICAL RECORDS REVIEWED over the past 24 hours: Nursing      Data     I have personally reviewed the following data over the past 24 hrs:    11.7 (H)  \   11.5 (L)   / 262     130 (L) 92 (L) 106.0 (H) /  111 (H)   5.5 (H) 19 (L) 12.93 (H) \       ALT: 274 (H) AST: 309 (H) AP: 1,584 (H) TBILI: 4.2 (H)   ALB: 2.6 (L) TOT PROTEIN: 6.2 (L) LIPASE: 841 (H)       INR:  1.27 (H) PTT:  32   D-dimer:  N/A Fibrinogen:  N/A

## 2023-07-22 NOTE — ANESTHESIA PREPROCEDURE EVALUATION
Anesthesia Pre-Procedure Evaluation    Patient: Rafael Santillan   MRN: [id number] : 1955        Procedure : Procedure(s):  ENDOSCOPIC RETROGRADE CHOLANGIOPANCREATOGRAPHY          No past medical history on file.   No past surgical history on file.   No Known Allergies   Social History     Tobacco Use     Smoking status: Not on file     Smokeless tobacco: Not on file   Substance Use Topics     Alcohol use: Not on file      Wt Readings from Last 1 Encounters:   23 96.8 kg (213 lb 6.5 oz)        Anesthesia Evaluation   Pt has had prior anesthetic. Type: General.    No history of anesthetic complications       ROS/MED HX  ENT/Pulmonary:  - neg pulmonary ROS     Neurologic:  - neg neurologic ROS     Cardiovascular: Comment: Echo 23:      Final Conclusion Previous Study: 3/21/23    Visually Estimated EF: 60%    Normal LV size and systolic function.    Normal left atrial size.    Posterior leaflet mitral valve prolapse.    Mild to moderate eccentric mitral regurgitation.    Normal IVC size, >50% collapse with respiration.     Cath 3/20/23:  Summary/Conclusions   PRESENTATION / INDICATIONS   * Urgent   * Non STEMI   VASCULAR ACCESS   * Using ultrasound guidance and a percutaneous technique, the right radial artery was accessed. Ultrasound was used to confirm vessel patency, localizing needle into the lumen of the vessel. An image was saved for the medical record.   DIAGNOSTIC - CORONARY   * Right dominant coronary artery system   * The left main artery has no significant obstruction.   * The LAD has no significant obstruction.   * The circumflex artery has no significant obstruction.   * The RCA is severely diseased.   DIAGNOSTIC - IVUS   * Diagnostic IVUS was used to assess the distal RCA artery   LEFT VENTRICULAR FUNCTION   * No LV angiogram was performed because it was not clinically indicated   INTERVENTION   * Successful angioplasty and stenting (drug eluting) of the proximal right coronary artery   *  Successful angioplasty and stenting (drug eluting) of the mid right coronary artery   * Successful angioplasty and stenting (drug eluting) of the distal right coronary artery   RECOMMENDATIONS & PLAN   * Plavix for 2 years - minimum   * Optimize risk factors and medications:  HDL > 45; LDL < 70; Triglycerides < 100.       (+) --CAD -past MI -stent-3/20/23. Drug Eluting Stent. Taking blood thinners valvular problems/murmurs type: MR  (-) murmur   METS/Exercise Tolerance:     Hematologic:       Musculoskeletal:       GI/Hepatic: Comment: Biliary stricture    (+) GERD,     Renal/Genitourinary:     (+) renal disease, type: ARF,     Endo:       Psychiatric/Substance Use:  - neg psychiatric ROS     Infectious Disease:       Malignancy:       Other:            Physical Exam    Airway        Mallampati: II   TM distance: > 3 FB   Neck ROM: full   Mouth opening: > 3 cm    Respiratory Devices and Support         Dental     Comment: Dentures on top    (+) Edentulous and Removable bridges or other hardware      Cardiovascular          Rhythm and rate: regular and normal (-) no murmur    Pulmonary           breath sounds clear to auscultation           OUTSIDE LABS:  CBC:   Lab Results   Component Value Date    WBC 16.1 (H) 07/21/2023    HGB 12.7 (L) 07/21/2023    HCT 38.7 (L) 07/21/2023     07/21/2023     BMP:   Lab Results   Component Value Date     (L) 07/21/2023     (L) 07/21/2023    POTASSIUM 5.4 (H) 07/21/2023    POTASSIUM 6.1 (HH) 07/21/2023    CHLORIDE 97 (L) 07/21/2023    CHLORIDE 97 (L) 07/21/2023    CO2 16 (L) 07/21/2023    CO2 16 (L) 07/21/2023    BUN 99.4 (H) 07/21/2023    BUN 99.4 (H) 07/21/2023    CR 12.36 (H) 07/21/2023    CR 12.36 (H) 07/21/2023    GLC 88 07/22/2023    GLC 80 07/21/2023     COAGS:   Lab Results   Component Value Date    PTT 32 07/21/2023    INR 1.28 (H) 07/21/2023     POC: No results found for: BGM, HCG, HCGS  HEPATIC:   Lab Results   Component Value Date    ALBUMIN 3.3 (L)  07/21/2023    PROTTOTAL 7.4 07/21/2023     (H) 07/21/2023     (H) 07/21/2023    ALKPHOS 1,870 (H) 07/21/2023    BILITOTAL 4.0 (H) 07/21/2023     OTHER:   Lab Results   Component Value Date    A1C 5.5 06/21/2010    ESTEFANY 9.3 07/21/2023    ESTEFANY 9.3 07/21/2023    LIPASE 841 (H) 07/21/2023    TSH 4.24 09/11/2020       Anesthesia Plan    ASA Status:  3   NPO Status:  NPO Appropriate    Anesthesia Type: MAC.     - Reason for MAC: chronic cardiopulmonary disease, immobility needed   Induction: Propofol, Intravenous.   Maintenance: Balanced.        Consents    Anesthesia Plan(s) and associated risks, benefits, and realistic alternatives discussed. Questions answered and patient/representative(s) expressed understanding.     - Discussed: Risks, Benefits and Alternatives for BOTH SEDATION and the PROCEDURE were discussed     - Discussed with:  Patient         Postoperative Care    Pain management: IV analgesics, Oral pain medications, Multi-modal analgesia.   PONV prophylaxis: Ondansetron (or other 5HT-3), Dexamethasone or Solumedrol     Comments:    Other Comments: MAC w/ propofol.  Plan for supine positioning.            Neeraj Collier MD

## 2023-07-22 NOTE — PROGRESS NOTES
Renal progress note  CC:KATHY  Assessment and Plan:  68 year old male with a past medical history of coronary artery disease s/p angiogram 3/20/2023 with PCI has been on aspirin and Plavix, history of GERD on omeprazole, HLD, history of chronic biliary strictures last procedure in 2013 at [facility], history of cholecystectomy complicated by injury of bile duct and recurrent strictures, history of hypothyroidism, baseline mild CKD 3, last known normal labs at baseline 2 weeks ago with respect to kidney function although they were significant for ongoing liver injury.  Presented with ongoing symptoms of nausea vomiting and feeling fatigued and poorly was noted to have an acutely elevated creatinine of 12.36 with significantly elevated liver enzymes, imaging was significant for biliary dilatation and concerns for recurrent obstruction.  Nephrology consulted for acute kidney injury     KATHY  Baseline CKD 3 with creatinine around 1.2-1.5 with GFR around 50-55  Baseline UA with mild proteins otherwise bland although current UA does appear consistent with ATN with granular casts WBCs RBCs and proteinuria  Urine proteins tubular range 1.15g/g  Imaging without any abnormality with respect to kidneys, except urinary bladder wall thickening consistent with possible chronic obstruction or cystitis, prior imaging have not have bladder wall thickening, also showing sequelae of chronic biliary stricture with mild intrahepatic biliary dilatation and parenchymal atrophy  Current KATHY appears possibly related to severe hepatic injury, possible HRS, timeframe to injury would be hard to attribute to biliary cast nephropathy.  Patient did receive contrast for CT scan in the past 2 weeks at [facility], timeframe would match for contrast nephropathy especially with background of underlying severe biliary obstruction and hepatic injury  --> acute hep panel, complements, NIK to rule out any additional etiologies for acute renal failure--  pending  Further serological work-up if any suggestive signs or labs from work-up  --> Manage conservatively for now BMP ordered every 12  Started on IV fluids isotonic bicarb at 100 mill per hour  --> Follow on ARUN and daily weights  Patient was counseled, with severe renal injury if he does not turn around or worsens with any acute indication of dialysis may need to consider initiating on dialysis in the next 24-48hr     Hyperkalemia  Likely secondary to acute renal failure and hepatic injury  We will correct acidosis follow potassiums s/p shifting  --> follow serial potassiums on bicarb infusion     Metabolic acidosis  Started on isotonic bicarb   Follow on BMPs every 12  labs consistent mostly with metabolic acidosis in the setting of KATHY     Mild hypotension  No history of hypertension blood pressure usually around 120s to 130s over 60s  No prior to admission antihypertensives documented  Clinically appears mild volume depleted  --> Isotonic bicarb as above has received normal saline bolus prior     Mild anemia  Appears hemoconcentrated possibly will downtrend with IV fluids  Follow closely  Likely inflammatory     Leukocytosis  With ongoing biliary obstruction concerns for ascending cholangitis started on antibiotics as per GI  We will follow closely continue IV fluids as above     History of chronic biliary stricture  History of cholecystectomy with known biliary stricture possibly secondary to bile duct injury  History of ERCP with stent placement last procedure was around 2013   Had a earlier CT scan in the last 2 weeks done with contrast that did show dilatation of intrahepatic and extrahepatic biliary ducts as well as lymphadenopathy which would be concerning for cholangitis  ERCP 7/21 with noted stricture, Bx and stent placement  Plavix held for procedure   On Zosyn     GERD continue on IV Protonix     History of mitral regurg   Last evaluation with cardiology appears to have prolapse of posterior mitral  valve leaflet  Last echo with stable EF     History of coronary artery disease s/p PCI in March 2023  On aspirin and Plavix , held for GI procedure, will need to resume   High risk for platelet dysfunction with severe renal failure, will need to monitor for bleed     Thank you for the consultation we will follow  Hamilton Mckeon MD  [facility]  [phone]      Subjective  Reports feeling well, feels very frustrated about not knowing exactly why his kidneys and liver failed.  He was evaluated outpatient in the last 3 weeks regarding his liver functions and kidney function and was found to have stable labs although the imaging done outpatient and showed a worsening biliary obstruction  Underwent ERCP today with biliary duct dilatation and stent placement  Kidney functions remain deranged with further elevation of BUN and worsening azotemia although getting isotonic bicarb continuously since yesterday  Discussed with patient and his wife regarding ongoing concern for worsening kidney function and need for dialysis in the coming 24 to 48 hours if labs do not turn around    Objective    Vital signs in last 24 hours  Temp:  [97.7  F (36.5  C)-98.5  F (36.9  C)] 97.8  F (36.6  C)  Pulse:  [59-87] 87  Resp:  [16-29] 18  BP: (110-141)/(53-69) 141/63  SpO2:  [95 %-100 %] 95 %  Weight:   [unfilled]    Intake/Output last 3 shifts  I/O last 3 completed shifts:  In: 667 [I.V.:667]  Out: -   Intake/Output this shift:  No intake/output data recorded.    Physical Exam  Alert, awake, NAD  CV: RRR without murmur or rub  Lung: clear and equal; no extra sounds  Ab: soft and NT; not distended; normal bs  Ext: no edema and well perfused  Skin; no rash    Pertinent Labs   Lab Results   Component Value Date    WBC 11.7 (H) 07/22/2023    HGB 11.5 (L) 07/22/2023    HCT 34.5 (L) 07/22/2023    MCV 93 07/22/2023     07/22/2023     Lab Results   Component Value Date    .0 (H) 07/22/2023     (L) 07/22/2023     CO2 19 (L) 07/22/2023       Lab Results   Component Value Date    ALBUMIN 2.6 (L) 07/22/2023     No results found for: PHOS  I reviewed all lab results  Hamilton Mckeon MD

## 2023-07-22 NOTE — PLAN OF CARE
Problem: Plan of Care - These are the overarching goals to be used throughout the patient stay.    Goal: Absence of Hospital-Acquired Illness or Injury  Intervention: Identify and Manage Fall Risk  Recent Flowsheet Documentation  Taken 7/21/2023 1821 by Roxanne Bejarano RN  Safety Promotion/Fall Prevention:   activity supervised   clutter free environment maintained   nonskid shoes/slippers when out of bed   safety round/check completed     Problem: Plan of Care - These are the overarching goals to be used throughout the patient stay.    Goal: Optimal Comfort and Wellbeing  Outcome: Progressing   Goal Outcome Evaluation:         A/Ox4. VSS on RA. Denies pain. Up SBA. NPO no exceptions. Tele NSR. Hyperkalemia protocol completed and Dr Hernandez notified, she ordered a new K recheck. K 5.4. Patient voided without difficulty. L PIV infusing sodium bicarb at 100ml/hr. GI following, plan for ERCP tomorrow. Nursing to continue to monitor.

## 2023-07-22 NOTE — PLAN OF CARE
Problem: Plan of Care - These are the overarching goals to be used throughout the patient stay.    Goal: Optimal Comfort and Wellbeing  Outcome: Progressing     Problem: Risk for Delirium  Goal: Optimal Coping  Outcome: Progressing     Problem: Risk for Delirium  Goal: Improved Attention and Thought Clarity  Outcome: Adequate for Care Transition  Intervention: Maximize Cognitive Function  Recent Flowsheet Documentation  Taken 7/22/2023 0857 by Darien Mott RN  Sensory Stimulation Regulation: care clustered  Reorientation Measures: clock in view   Goal Outcome Evaluation:       Patient was A/O x4 prior to going down for ERCP. NPO maintained this shift. VSS. Patient has rated pain at 1/10 this shift and refusing pain meds.     Patient has been ready to discharge today and has asked about anticipated discharge date. Nursing staff informed him it is expected to be tomorrow.     Patient further asked nursing staff to explain his medical conditions to him, specifically renal and hepatic deficits. Nurse was unable to find concrete caused of renal and hepatic conditions aside from current GI distress. Patient re-directed to consult specialties and hospitalist to explain his medical conditions to him.     Patient came up from ERCP at approx. 14:30. Patient was moderately sedated with vitals WNL. Patient reported throat pain. Evening nurse notified to follow up with provider for post-ERCP discomfort.

## 2023-07-22 NOTE — ANESTHESIA POSTPROCEDURE EVALUATION
Patient: Rafael Santillan    Procedure: Procedure(s):  ENDOSCOPIC RETROGRADE CHOLANGIOPANCREATOGRAPHY WITH BILIARY STENT PLACEMENT       Anesthesia Type:  General    Note:  Disposition: Inpatient   Postop Pain Control: Uneventful            Sign Out: Well controlled pain   PONV: No   Neuro/Psych: Uneventful            Sign Out: Acceptable/Baseline neuro status   Airway/Respiratory: Uneventful            Sign Out: Acceptable/Baseline resp. status   CV/Hemodynamics: Uneventful            Sign Out: Acceptable CV status; No obvious hypovolemia; No obvious fluid overload   Other NRE: NONE   DID A NON-ROUTINE EVENT OCCUR? No           Last vitals:  Vitals:    07/22/23 1610 07/22/23 1627 07/22/23 1700   BP: (!) 147/67 (!) 154/71 (!) 146/69   Pulse: 78 78 87   Resp:  18 18   Temp:  36.7  C (98  F) 36.6  C (97.9  F)   SpO2:  96% 95%       Electronically Signed By: Neeraj Collier MD  July 22, 2023  5:25 PM

## 2023-07-22 NOTE — PLAN OF CARE
Problem: Plan of Care - These are the overarching goals to be used throughout the patient stay.    Goal: Absence of Hospital-Acquired Illness or Injury  Intervention: Identify and Manage Fall Risk  Recent Flowsheet Documentation  Taken 7/22/2023 0038 by Zeina Thurman RN  Safety Promotion/Fall Prevention:    activity supervised    clutter free environment maintained    nonskid shoes/slippers when out of bed    safety round/check completed   Goal Outcome Evaluation:       Patient is alert and able to communicate needs. Potassium protocol, sodium bicarb infusing at 100/hr. Denies pain, slept all shift.

## 2023-07-22 NOTE — ANESTHESIA CARE TRANSFER NOTE
Patient: Rafael Santillan    Procedure: Procedure(s):  ENDOSCOPIC RETROGRADE CHOLANGIOPANCREATOGRAPHY       Diagnosis: Biliary stricture [K83.1]  Diagnosis Additional Information: No value filed.    Anesthesia Type:   MAC    Note:    Oropharynx: oropharynx clear of all foreign objects and spontaneously breathing  Level of Consciousness: awake  Oxygen Supplementation: room air    Independent Airway: airway patency satisfactory and stable  Dentition: dentition unchanged  Vital Signs Stable: post-procedure vital signs reviewed and stable  Report to RN Given: handoff report given  Patient transferred to: Medical/Surgical Unit  Comments: Report called to floor RN prior to transport.   Handoff Report: Identifed the Patient, Identified the Reponsible Provider, Reviewed the pertinent medical history, Discussed the surgical course, Reviewed Intra-OP anesthesia mangement and issues during anesthesia, Set expectations for post-procedure period and Allowed opportunity for questions and acknowledgement of understanding      Vitals:  Vitals Value Taken Time   /59 7/22/23 1441   Temp     Pulse 95    Resp 14    SpO2 95%        Electronically Signed By: AISLINN Schneider CRNA  July 22, 2023  2:41 PM

## 2023-07-23 ENCOUNTER — APPOINTMENT (OUTPATIENT)
Dept: INTERVENTIONAL RADIOLOGY/VASCULAR | Facility: HOSPITAL | Age: 68
End: 2023-07-23
Attending: RADIOLOGY
Payer: COMMERCIAL

## 2023-07-23 LAB
ALBUMIN SERPL BCG-MCNC: 2.6 G/DL (ref 3.5–5.2)
ANION GAP SERPL CALCULATED.3IONS-SCNC: 18 MMOL/L (ref 7–15)
BACTERIA UR CULT: NORMAL
BUN SERPL-MCNC: 108.4 MG/DL (ref 8–23)
CALCIUM SERPL-MCNC: 8.2 MG/DL (ref 8.8–10.2)
CH50 SERPL-ACNC: 56 U/ML
CHLORIDE SERPL-SCNC: 87 MMOL/L (ref 98–107)
CREAT SERPL-MCNC: 14 MG/DL (ref 0.67–1.17)
DEPRECATED HCO3 PLAS-SCNC: 24 MMOL/L (ref 22–29)
GFR SERPL CREATININE-BSD FRML MDRD: 3 ML/MIN/1.73M2
GLUCOSE SERPL-MCNC: 119 MG/DL (ref 70–99)
PHOSPHATE SERPL-MCNC: 6 MG/DL (ref 2.5–4.5)
POTASSIUM SERPL-SCNC: 4.7 MMOL/L (ref 3.4–5.3)
SODIUM SERPL-SCNC: 129 MMOL/L (ref 136–145)

## 2023-07-23 PROCEDURE — 258N000002 HC RX IP 258 OP 250: Performed by: INTERNAL MEDICINE

## 2023-07-23 PROCEDURE — 250N000009 HC RX 250: Performed by: INTERNAL MEDICINE

## 2023-07-23 PROCEDURE — 99418 PROLNG IP/OBS E/M EA 15 MIN: CPT | Performed by: INTERNAL MEDICINE

## 2023-07-23 PROCEDURE — 99233 SBSQ HOSP IP/OBS HIGH 50: CPT | Performed by: INTERNAL MEDICINE

## 2023-07-23 PROCEDURE — 0JH63XZ INSERTION OF TUNNELED VASCULAR ACCESS DEVICE INTO CHEST SUBCUTANEOUS TISSUE AND FASCIA, PERCUTANEOUS APPROACH: ICD-10-PCS | Performed by: RADIOLOGY

## 2023-07-23 PROCEDURE — 80069 RENAL FUNCTION PANEL: CPT | Performed by: INTERNAL MEDICINE

## 2023-07-23 PROCEDURE — 02H633Z INSERTION OF INFUSION DEVICE INTO RIGHT ATRIUM, PERCUTANEOUS APPROACH: ICD-10-PCS | Performed by: RADIOLOGY

## 2023-07-23 PROCEDURE — 250N000011 HC RX IP 250 OP 636: Performed by: INTERNAL MEDICINE

## 2023-07-23 PROCEDURE — 36558 INSERT TUNNELED CV CATH: CPT

## 2023-07-23 PROCEDURE — 87340 HEPATITIS B SURFACE AG IA: CPT | Performed by: INTERNAL MEDICINE

## 2023-07-23 PROCEDURE — 250N000011 HC RX IP 250 OP 636: Mod: JZ | Performed by: INTERNAL MEDICINE

## 2023-07-23 PROCEDURE — 250N000011 HC RX IP 250 OP 636: Performed by: RADIOLOGY

## 2023-07-23 PROCEDURE — 36415 COLL VENOUS BLD VENIPUNCTURE: CPT | Performed by: INTERNAL MEDICINE

## 2023-07-23 PROCEDURE — 250N000013 HC RX MED GY IP 250 OP 250 PS 637: Performed by: INTERNAL MEDICINE

## 2023-07-23 PROCEDURE — C1769 GUIDE WIRE: HCPCS

## 2023-07-23 PROCEDURE — 90935 HEMODIALYSIS ONE EVALUATION: CPT

## 2023-07-23 PROCEDURE — 86706 HEP B SURFACE ANTIBODY: CPT | Performed by: INTERNAL MEDICINE

## 2023-07-23 PROCEDURE — 272N000500 HC NEEDLE CR2

## 2023-07-23 PROCEDURE — 120N000001 HC R&B MED SURG/OB

## 2023-07-23 PROCEDURE — C1750 CATH, HEMODIALYSIS,LONG-TERM: HCPCS

## 2023-07-23 PROCEDURE — 250N000009 HC RX 250: Performed by: RADIOLOGY

## 2023-07-23 PROCEDURE — C9113 INJ PANTOPRAZOLE SODIUM, VIA: HCPCS | Mod: JZ | Performed by: INTERNAL MEDICINE

## 2023-07-23 PROCEDURE — 99152 MOD SED SAME PHYS/QHP 5/>YRS: CPT

## 2023-07-23 PROCEDURE — 258N000003 HC RX IP 258 OP 636: Performed by: INTERNAL MEDICINE

## 2023-07-23 PROCEDURE — 86704 HEP B CORE ANTIBODY TOTAL: CPT | Performed by: INTERNAL MEDICINE

## 2023-07-23 RX ORDER — CEFAZOLIN SODIUM/WATER 2 G/20 ML
2 SYRINGE (ML) INTRAVENOUS
Status: COMPLETED | OUTPATIENT
Start: 2023-07-23 | End: 2023-07-23

## 2023-07-23 RX ORDER — NALOXONE HYDROCHLORIDE 0.4 MG/ML
0.2 INJECTION, SOLUTION INTRAMUSCULAR; INTRAVENOUS; SUBCUTANEOUS
Status: DISCONTINUED | OUTPATIENT
Start: 2023-07-23 | End: 2023-07-23 | Stop reason: HOSPADM

## 2023-07-23 RX ORDER — ALBUMIN (HUMAN) 12.5 G/50ML
50 SOLUTION INTRAVENOUS
Status: DISCONTINUED | OUTPATIENT
Start: 2023-07-23 | End: 2023-07-24

## 2023-07-23 RX ORDER — NALOXONE HYDROCHLORIDE 0.4 MG/ML
0.4 INJECTION, SOLUTION INTRAMUSCULAR; INTRAVENOUS; SUBCUTANEOUS
Status: DISCONTINUED | OUTPATIENT
Start: 2023-07-23 | End: 2023-07-23 | Stop reason: HOSPADM

## 2023-07-23 RX ORDER — HEPARIN SODIUM 1000 [USP'U]/ML
4.5 INJECTION, SOLUTION INTRAVENOUS; SUBCUTANEOUS ONCE
Status: COMPLETED | OUTPATIENT
Start: 2023-07-23 | End: 2023-07-23

## 2023-07-23 RX ORDER — FLUMAZENIL 0.1 MG/ML
0.2 INJECTION, SOLUTION INTRAVENOUS
Status: DISCONTINUED | OUTPATIENT
Start: 2023-07-23 | End: 2023-07-23 | Stop reason: HOSPADM

## 2023-07-23 RX ORDER — SODIUM CHLORIDE, SODIUM LACTATE, POTASSIUM CHLORIDE, CALCIUM CHLORIDE 600; 310; 30; 20 MG/100ML; MG/100ML; MG/100ML; MG/100ML
INJECTION, SOLUTION INTRAVENOUS CONTINUOUS
Status: DISCONTINUED | OUTPATIENT
Start: 2023-07-23 | End: 2023-07-23

## 2023-07-23 RX ORDER — HEPARIN SODIUM 200 [USP'U]/100ML
1 INJECTION, SOLUTION INTRAVENOUS CONTINUOUS PRN
Status: DISCONTINUED | OUTPATIENT
Start: 2023-07-23 | End: 2023-07-23 | Stop reason: HOSPADM

## 2023-07-23 RX ORDER — OXYMETAZOLINE HYDROCHLORIDE 0.05 G/100ML
2 SPRAY NASAL 2 TIMES DAILY
Status: COMPLETED | OUTPATIENT
Start: 2023-07-23 | End: 2023-07-24

## 2023-07-23 RX ORDER — FENTANYL CITRATE 50 UG/ML
25-50 INJECTION, SOLUTION INTRAMUSCULAR; INTRAVENOUS EVERY 5 MIN PRN
Status: DISCONTINUED | OUTPATIENT
Start: 2023-07-23 | End: 2023-07-23 | Stop reason: HOSPADM

## 2023-07-23 RX ADMIN — FENTANYL CITRATE 25 MCG: 50 INJECTION, SOLUTION INTRAMUSCULAR; INTRAVENOUS at 15:31

## 2023-07-23 RX ADMIN — PANTOPRAZOLE SODIUM 40 MG: 40 INJECTION, POWDER, FOR SOLUTION INTRAVENOUS at 08:45

## 2023-07-23 RX ADMIN — SODIUM BICARBONATE: 84 INJECTION, SOLUTION INTRAVENOUS at 07:36

## 2023-07-23 RX ADMIN — Medication 2 G: at 15:21

## 2023-07-23 RX ADMIN — LIDOCAINE HYDROCHLORIDE 15 ML: 10 INJECTION, SOLUTION INFILTRATION; PERINEURAL at 15:27

## 2023-07-23 RX ADMIN — MIDAZOLAM HYDROCHLORIDE 0.5 MG: 1 INJECTION, SOLUTION INTRAMUSCULAR; INTRAVENOUS at 15:27

## 2023-07-23 RX ADMIN — SODIUM BICARBONATE: 84 INJECTION, SOLUTION INTRAVENOUS at 08:45

## 2023-07-23 RX ADMIN — OXYMETAZOLINE HYDROCHLORIDE 2 SPRAY: 0.05 SPRAY NASAL at 11:05

## 2023-07-23 RX ADMIN — FENTANYL CITRATE 50 MCG: 50 INJECTION, SOLUTION INTRAMUSCULAR; INTRAVENOUS at 15:22

## 2023-07-23 RX ADMIN — Medication 1 MG: at 23:03

## 2023-07-23 RX ADMIN — OXYMETAZOLINE HYDROCHLORIDE 2 SPRAY: 0.05 SPRAY NASAL at 22:58

## 2023-07-23 RX ADMIN — HEPARIN SODIUM 4500 UNITS: 1000 INJECTION, SOLUTION INTRAVENOUS; SUBCUTANEOUS at 15:35

## 2023-07-23 RX ADMIN — MIDAZOLAM HYDROCHLORIDE 1 MG: 1 INJECTION, SOLUTION INTRAMUSCULAR; INTRAVENOUS at 15:19

## 2023-07-23 RX ADMIN — DESMOPRESSIN ACETATE 30 MCG: 4 INJECTION, SOLUTION INTRAVENOUS; SUBCUTANEOUS at 19:29

## 2023-07-23 ASSESSMENT — ACTIVITIES OF DAILY LIVING (ADL)
ADLS_ACUITY_SCORE: 22
ADLS_ACUITY_SCORE: 23
ADLS_ACUITY_SCORE: 22
ADLS_ACUITY_SCORE: 23
ADLS_ACUITY_SCORE: 22
ADLS_ACUITY_SCORE: 23

## 2023-07-23 NOTE — PROGRESS NOTES
Nurse reported bilateral nosebleed/epistaxis.  Packing did not help.  5.5 cm Rhino Rocket was inserted by me in the right nostril at 8:15 AM on 7/23 without any complications.  7 cc of air was injected in the balloon.  Patient tolerated procedure well.  Posterior pharynx was checked for any residual bleeding.  None was found.  We will keep the Rhino Rocket in the right nostril for 24 to 48 hours.    Addendum Jian Jeronimo MD, Hospitalist,07/23/23,11:01 AM  Pt continues to bleed from left nostril. Offered another rhino rocket which patient refused. Agree to oxymetazoline nasal spray. Ordered the same. Pt said that nasal spray had worked in the past.  Monitor Hgb.

## 2023-07-23 NOTE — PROGRESS NOTES
Seen on HD  First treatment  Bleed from CVC exit site and tunnel site  DDAVP ordered x1 dose  Pressure dressing   Will place ice pack  Likely sec to platelet dysfunction  Contact IR if oozing continues      Hamilton Mckeon MD  [facility]  [phone]

## 2023-07-23 NOTE — PROGRESS NOTES
Renal progress note  CC:KATHY  Assessment and Plan:  68 year old male with a past medical history of coronary artery disease s/p angiogram 3/20/2023 with PCI has been on aspirin and Plavix, history of GERD on omeprazole, HLD, history of chronic biliary strictures last procedure in 2013 at [facility], history of cholecystectomy complicated by injury of bile duct and recurrent strictures, history of hypothyroidism, baseline mild CKD 3, last known normal labs at baseline 2 weeks ago with respect to kidney function although they were significant for ongoing liver injury.  Presented with ongoing symptoms of nausea vomiting and feeling fatigued and poorly was noted to have an acutely elevated creatinine of 12.36 with significantly elevated liver enzymes, imaging was significant for biliary dilatation and concerns for recurrent obstruction.  Nephrology consulted for acute kidney injury      KATHY  Baseline CKD 3 with creatinine around 1.2-1.5 with GFR around 50-55  Baseline UA with mild proteins otherwise bland although current UA does appear consistent with ATN with granular casts WBCs RBCs and proteinuria  Urine proteins tubular range 1.15g/g  Imaging without any abnormality with respect to kidneys, except urinary bladder wall thickening consistent with possible chronic obstruction or cystitis, prior imaging have not have bladder wall thickening, also showing sequelae of chronic biliary stricture with mild intrahepatic biliary dilatation and parenchymal atrophy  Current KATHY appears possibly related to severe hepatic injury, possible HRS, timeframe to injury would be hard to attribute to biliary cast nephropathy.    Patient did receive contrast for CT scan in the past 2 weeks at [facility], timeframe would match for contrast nephropathy especially with background of underlying severe biliary obstruction and hepatic injury  --> acute hep panel -ve, complements, NIK to rule out any additional etiologies for acute renal failure are  pending  Further serological work-up if any suggestive signs or labs from work-up  --> with worsening renal function , mild uremia and possible bleed sec to platelet dysfunction , will start on HD today  -- requested IR to place HD cath and initial run today for 2 hrs   HD ordered for tomorrow 3hrs and will continue MWF or based on assessment after that  --> once on HD discontinue IVF  --> Follow on ARUN and daily weights  Patient was counseled, and consents obtained to initiate HD     Hyperkalemia  Likely secondary to acute renal failure and hepatic injury  We will correct acidosis follow potassiums s/p shifting  --> starting on HD today , will run with 4 K tonight then 3K per protocols/labs     Metabolic acidosis  on isotonic bicarb --> switched to 1/2NS with bicarb , discontinue after HD initiated   Follow on labs daily  labs consistent mostly with metabolic acidosis in the setting of KATHY     Mild hypotension  No history of hypertension blood pressure usually around 120s to 130s over 60s  No prior to admission antihypertensives documented  Clinically appears mild volume depleted  --> ivf as above has received normal saline bolus prior     Mild anemia  Appears hemoconcentrated possibly will downtrend with IV fluids  Follow closely  Likely inflammatory  Now with nose bleed, hb monitor     Leukocytosis  With ongoing biliary obstruction concerns for ascending cholangitis started on antibiotics as per GI  We will follow closely continue IV fluids as above     History of chronic biliary stricture  History of cholecystectomy with known biliary stricture possibly secondary to bile duct injury  History of ERCP with stent placement last procedure was around 2013   Had a earlier CT scan in the last 2 weeks done with contrast that did show dilatation of intrahepatic and extrahepatic biliary ducts as well as lymphadenopathy which would be concerning for cholangitis  ERCP 7/21 with noted stricture, Bx and stent  placement  Plavix held for procedure   On Zosyn     GERD continue on IV Protonix     History of mitral regurg   Last evaluation with cardiology appears to have prolapse of posterior mitral valve leaflet  Last echo with stable EF     History of coronary artery disease s/p PCI in March 2023  On aspirin and Plavix , held for GI procedure, will need to resume eventually , now with nose bleeds  High risk for platelet dysfunction with severe renal failure, will need to monitor for bleed     Thank you for the consultation we will follow  Hamilton Mckeon MD  [facility]  [phone]      Subjective  Reports feeling well, feels very frustrated about not knowing exactly why his kidneys and liver failed.   Underwent ERCP 7/22 with biliary duct dilatation and stent placement  Kidney functions remain deranged with further elevation of BUN and worsening azotemia   Discussed with patient and his wife regarding ongoing concern for worsening kidney function and need for dialysis , they show adequate understanding  Will arrange for HD cath placement today  He has been off ASA and plavix with GI procedure not resumed yet and nose bleed this am, likely with some platelet dysfunction  Has rhino rockets in nose  Will dialyze for 2 hrs today    Objective    Vital signs in last 24 hours  Temp:  [97.6  F (36.4  C)-98.7  F (37.1  C)] 98.1  F (36.7  C)  Pulse:  [60-87] 61  Resp:  [18-20] 18  BP: (111-154)/(54-72) 150/72  SpO2:  [95 %-98 %] 97 %  Weight:   [unfilled]    Intake/Output last 3 shifts  I/O last 3 completed shifts:  In: 2314 [P.O.:240; I.V.:2074]  Out: 320 [Urine:320]  Intake/Output this shift:  No intake/output data recorded.    Physical Exam  Alert, awake, NAD  CV: RRR without murmur or rub  Lung: clear and equal; no extra sounds  Ab: soft and NT; not distended; normal bs  Ext: +edema and well perfused  Skin; no rash    Pertinent Labs   Lab Results   Component Value Date    WBC 11.7 (H) 07/22/2023    HGB 11.5  (L) 07/22/2023    HCT 34.5 (L) 07/22/2023    MCV 93 07/22/2023     07/22/2023     Lab Results   Component Value Date    .4 (H) 07/23/2023     (L) 07/23/2023    CO2 24 07/23/2023       Lab Results   Component Value Date    ALBUMIN 2.6 (L) 07/23/2023     Lab Results   Component Value Date    PHOS 6.0 (H) 07/23/2023     I reviewed all lab results  Hamilton Mckeon MD

## 2023-07-23 NOTE — PLAN OF CARE
Problem: Plan of Care - These are the overarching goals to be used throughout the patient stay.    Goal: Optimal Comfort and Wellbeing  Intervention: Monitor Pain and Promote Comfort  Recent Flowsheet Documentation  Taken 7/23/2023 0100 by Carolina Hernandez RN  Pain Management Interventions: distraction     Problem: Risk for Delirium  Goal: Improved Attention and Thought Clarity  Intervention: Maximize Cognitive Function  Recent Flowsheet Documentation  Taken 7/23/2023 0100 by Carolina Hernandez RN  Reorientation Measures: clock in view   Goal Outcome Evaluation:       Pt is A/O X disoriented to time, has been up to bathroom several times to try and void but has been unable, bladder scan and straight cath protocols followed. Vss, had a  nose bleed this morning and it lasted for more than 30 mins, pt says he's had nose bleed before and it lasted long, he just had a grape size clot bleed and house was called to give update about it, house said if it continued in the next 15 mins a call should be made again . Complained of just mild pain and denied pain  meds.

## 2023-07-23 NOTE — PLAN OF CARE
Problem: Plan of Care - These are the overarching goals to be used throughout the patient stay.    Goal: Optimal Comfort and Wellbeing  Outcome: Progressing  Intervention: Monitor Pain and Promote Comfort  Recent Flowsheet Documentation  Taken 7/22/2023 1850 by Roxanne Bejarano, RN  Pain Management Interventions:    emotional support    rest  Taken 7/22/2023 1600 by Roxanne Bejarano, RN  Pain Management Interventions: emotional support     Problem: Plan of Care - These are the overarching goals to be used throughout the patient stay.    Goal: Absence of Hospital-Acquired Illness or Injury  Intervention: Prevent Skin Injury  Recent Flowsheet Documentation  Taken 7/22/2023 1600 by Roxanne Bejarano, RN  Body Position: position changed independently   Goal Outcome Evaluation:         A/Ox4. VSS ex elevated BP. On RA. C/o 1/10 pain, no PRN needed. C/o of cough/sore throat, PRN robitussin given x1. Up SBA. Tele NSR. L PIV infusing sodium bicarb at 100ml/hr. Clear liquid diet. GI following. Nephrology following. Nursing to continue to monitor.

## 2023-07-23 NOTE — PROGRESS NOTES
[facility] progress Note    Subjective: Patient had ERCP yesterday, had episodes of epistaxis currently seen in the hospitalist note.  Overall feeling okay  Objective:  Vital signs in last 24 hours  Temp:  [97.6  F (36.4  C)-98.7  F (37.1  C)] 98.1  F (36.7  C)  Pulse:  [60-87] 70  Resp:  [8-20] 14  BP: (119-154)/(58-72) 129/65  SpO2:  [92 %-98 %] 97 %     Gen: Awake, no acute distress, ectatic.  Gastrointestinal: Soft, non-tender,     Patient Active Problem List   Diagnosis     RUQ abdominal pain     Biliary stricture     Abnormal liver enzymes     Acute renal failure, unspecified acute renal failure type (H)     Nausea and vomiting, unspecified vomiting type       Labs:  CMP Results:   Recent Labs   Lab Test 07/23/23  0639 07/22/23 0718   * 130*   POTASSIUM 4.7 5.5*   CHLORIDE 87* 92*   CO2 24 19*   ANIONGAP 18* 19*   * 111*   .4* 106.0*   CR 14.00* 12.93*   BILITOTAL  --  4.2*   ALKPHOS  --  1,584*   ALT  --  274*   AST  --  309*      CBC  Recent Labs   Lab 07/22/23  0718 07/21/23  1313   WBC 11.7* 16.1*   RBC 3.72* 4.08*   HGB 11.5* 12.7*   HCT 34.5* 38.7*   MCV 93 95   MCH 30.9 31.1   MCHC 33.3 32.8   RDW 14.6 14.8    272     INR  Recent Labs   Lab 07/22/23  0718 07/21/23  1719 07/21/23  1506   INR 1.27* 1.28* 1.24*      Lipase   Date Value Ref Range Status   07/21/2023 841 (H) 13 - 60 U/L Final     Recent Labs   Lab 07/22/23  0718 07/21/23  1313   * 339*   * 311*   ALKPHOS 1,584* 1,870*   BILITOTAL 4.2* 4.0*   LIPASE  --  841*     CT scan on 7/21/2003 showing  IMPRESSION: 1.  Circumferential urinary bladder wall thickening could be secondary to underdistention or cystitis. Correlate with urinalysis. 2.  Sequelae of chronic biliary stricture in the left hepatic lobe with associated mild intrahepatic biliary ductal dilatation and parenchymal atrophy.   ERCP on 1/23/2013 showing  Impressions/Post-Op Diagnosis:        - Three stents were removed from the biliary  tree.        - A moderate localized biliary stricture was found. The stricture was        post-surgical, benign appearing and fibrotic. Good flow of contrast was        noted readily across the patent lumen of the stricture. The stricture        was biopsied.        - Choledocholithiasis was found. Complete removal was accomplished by        sweeping.                                                                                   Recommendation:        - Perform MRCP in 3 months.        - Check liver enzymes (AST, ALT, Alkaline Phosphatase,   Bilirubin) at      appointment to be scheduled.        - Observe patient's clinical course.        - Watch for pancreatitis, bleeding, perforation, and cholangitis.    MRCP on 4/15/2013 showing  1. Presumed proximal biliary stricture left lateral segment, with associated mild to moderate intrahepatic biliary ductal dilatation and atrophy which should be related to long-standing biliary obstruction.   2. Mild short segment stricture common hepatic duct just below a normal variant trifurcation.   3. No evidence of retained stone.   4. Fatty liver.    ERCP on 7/21/2023 showing :  A single localized biliary stricture was found in                          the lower third of the main bile duct. The stricture                          was indeterminate. This was biopsied and aspirated and                          the fluid was sent for cytology. This stricture was                          treated with dilation and stent placement.   Recommendation:        - Return patient to hospital [facility] for ongoing care.                          - Await pathology results.                          - EUS and stent exchange in the next couple of weeks.       Assessment:   68 years old male with history of coronary artery disease s/p stenting, NSTEMI, s/p cholecystectomy, known history of biliary strictures s/p ERCPs with stenting in the past.  Elevated LFTs likely from the biliary stricture.  S/p ERCP  with stent placement and biopsies on 7/21/2023.  Clinically doing okay.  Acute renal failure.  Plan for dialysis per nephrology.    Plan:   Can resume Plavix from GI standpoint.  Await pathology results.  Follow LFTs.  EUS and stent exchange in the couple of weeks.  Total time spent was 17 minutes.                                             Yaniv Yeh MD  Thank you for the opportunity to participate in the care of this patient.   Please feel free to call me with any questions or concerns.  Phone number [phone].               Negative

## 2023-07-23 NOTE — PROGRESS NOTES
[facility]    Medicine Progress Note - Hospitalist Service    Date of Admission:  7/21/2023    Assessment & Plan      Rafael Santillan is a 68 year old male admitted on 7/21/2023. He presented with 2 weeks of off-and-on abdominal pain nausea and vomiting was found to have acute renal failure, hyperkalemia, elevated liver enzymes.  Nephrology and GI consulted.  ERCP done on 7/22, biliary stent placed.  Creatinine is worsening with oliguria.  Cordero's catheter inserted on 7/2.  Patient has also epistaxis and therefore Rhino Rocket and Afrin nasal spray administered.  Patient is refusing left-sided Rhino Rocket to control epistaxis.  Likely epistaxis will improve with initiation of dialysis from platelet dysfunction due to uremia.      Abdominal pain and elevated liver enzyme likely due to biliary stricture  --Elevated liver enzymes on admission--  --Biliary stent placed on 7/22.  Repeat LFT next a.m.  --Acute hepatitis panel is negative  -ERCP done on 7/22  --DC Zosyn  --Currently on bicarb drip    Acute renal failure with hyperkalemia.  Hyperkalemia is improving  Changed to oliguric.  Awaiting dialysis.  Metabolic acidosis  --Cordero's catheter inserted on 7/23 due to urinary retention and strict output measurement  --NIK is pending  --Baseline creatinine 1.3  Hold losartan, statin, aspirin, Plavix  -- Total CK is normal    Acute epistaxis bilateral nostril-7/23  Likely related to platelet dysfunction-from uremia  -More ever patient has history of nosebleeds in the past.  -- Refused nasal packing and compression as per nursing  -- Right nostril Rhino Rocket inserted today.  See procedure note.  Patient had left-sided epistaxis but refused Rhino Rocket.  Ordered oxymetazoline spray.  -- If unable to control the bleeding then will remove right Rhino Rocket and place left nostril Rhino Rocket soaked in oxymetazoline.  -- Starting dialysis will also improve platelet dysfunction and thereby achieve  "hemostasis.  -- Check hemoglobin next a.m.    GERD.    -Continue home medications  -Continue r IV Protonix    Hyponatremia likely due to hypervolemia and renal failure  --Continues to worsen.  Nephrology managing the same  --Currently on bicarb drip    Leukocytosis likely reactive  --Improving    Urine culture is negative and therefore DC Zosyn    History of mitral regurgitation  --Does not appear to be fluid overloaded  --Monitor fluid status every day to avoid hypervolemia       Diet: NPO for Medical/Clinical Reasons Except for: Meds    DVT Prophylaxis: Low Risk/Ambulatory with no VTE prophylaxis indicated  Cordero Catheter: PRESENT, indication:    Lines: None     Cardiac Monitoring: None  Code Status: Full Code      Clinically Significant Risk Factors        # Hyperkalemia: Highest K = 6.1 mmol/L in last 2 days, will monitor as appropriate  # Hyponatremia: Lowest Na = 129 mmol/L in last 2 days, will monitor as appropriate   # Hypercalcemia: corrected calcium is >10.1, will monitor as appropriate   # Anion Gap Metabolic Acidosis: Highest Anion Gap = 19 mmol/L in last 2 days, will monitor and treat as appropriate  # Hypoalbuminemia: Lowest albumin = 2.6 g/dL at 7/23/2023  6:39 AM, will monitor as appropriate  # Coagulation Defect: INR = 1.27 (Ref range: 0.85 - 1.15) and/or PTT = 32 Seconds (Ref range: 22 - 38 Seconds), will monitor for bleeding   # Acute Kidney Injury, unspecified: based on a >150% or 0.3 mg/dL increase in last creatinine compared to past 90 day average, will monitor renal function         # Obesity: Estimated body mass index is 31.09 kg/m  as calculated from the following:    Height as of this encounter: 1.778 m (5' 10\").    Weight as of this encounter: 98.3 kg (216 lb 11.2 oz)., PRESENT ON ADMISSION          Disposition Plan      Expected Discharge Date: 07/25/2023      Destination: home with family            Jian Jeronimo MD  Hospitalist Service  [facility]  Securely " message with Bert (more info)  Text page via [facility] Paging/Directory   ______________________________________________________________________    Interval History   BUN and creatinine has been worsening.  Urine output less than 400 cc.  Nephrology ordered dialysis catheter.  I ordered Cordero's catheter since patient is requiring straight cath x2.  Earlier this morning patient had bowel movement although her did not slow down.  He has a history of Hmong..  He refused to pinch nose and keep the head down stating that blood clots can get worse when he does that.  I inserted Rhino Rocket in the right nostril however patient continued to bleed from the left nostril.  Offered another Rhino Rocket in the left nostril but patient refused.  Ordered Afrin nasal spray to control bleeding.  Likely initiation of dialysis will control his bleeding with platelet dysfunction in uremia.  Potassium is corrected to 4.7.  Urine culture is negative.    Physical Exam   Vital Signs: Temp: 98.1  F (36.7  C) Temp src: Oral BP: (!) 150/72 Pulse: 61   Resp: 18 SpO2: 97 % O2 Device: None (Room air)    Weight: 216 lbs 11.2 oz urine output of 564    Rhino Rocket in the right nostril.  Epistaxis from the left nostril.  Bowel sounds are positive  Did not elicit tenderness due to patient's discomfort  Normal mood and affect      Medical Decision Making       55 MINUTES SPENT BY ME on the date of service doing chart review, history, exam, documentation & further activities per the note.  MANAGEMENT DISCUSSED with the following over the past 24 hours: Patient   NOTE(S)/MEDICAL RECORDS REVIEWED over the past 24 hours: Nursing      Data     I have personally reviewed the following data over the past 24 hrs:    N/A  \   N/A   / N/A     129 (L) 87 (L) 108.4 (H) /  119 (H)   4.7 24 14.00 (H) \       ALT: N/A AST: N/A AP: N/A TBILI: N/A   ALB: 2.6 (L) TOT PROTEIN: N/A LIPASE: N/A

## 2023-07-23 NOTE — PLAN OF CARE
Problem: Urinary Retention  Goal: Effective Urinary Elimination  Outcome: Progressing   Goal Outcome Evaluation:         Hospitalist paged at start of shift due to epistaxis with large clots. Patient refused to pinch nose with head lowered stating the clots would worsen. Rhino rocket placed by Hospitalist to right nostril. Bleeding continued to left nostril with clots. Afrin ordered stat and was effective for nosebleed.       Bladder scanned x2 for 213mL and 257mL. Patient attempted to urinate but was unsuccessful and reported discomfort. MD updated, ponce placed with good urine return.     Patient taken to IR at 14:50 for placement of HD catheter. NPO since before breakfast.     Patient denied having pain this shift.

## 2023-07-23 NOTE — PROCEDURES
Interventional Radiology Post-Procedure Note   ?   Brief Procedure Note:   Patient name: Rafael Santillan  Pt MRN:[id number]   Date of procedure: 7/23/2023     Procedure(s): Tunneled hemodialysis catheter placement  Sedation method: Moderate sedation was employed. The patient was monitored by an interventional radiology nurse at all times throughout the procedure under my direct guidance.  Pre Procedure Diagnosis: KATHY and CKD  Post Procedure Diagnosis: Same  Indications: Need for moderate-term central venous access for hemodialysis.   ?   Attending: John Botello M.D.  Specimen(s) removed: None   Additional studies ordered: None  Drains: None   Lines: 23 cm (Tip-to-cuff) 15 Fr Duraflow 2 Hemodialysis catheter  Estimated Blood Loss: Minimal  Complications: None  Vascular closure method: N/A    Findings/Notes/Comments: Uncomplicated placement of right internal jugular hemodialysis catheter. Catheter tip lies in the high/mid right atrium. Catheter is ready for immediate use.   ?   Please see dictation in PACS or under the Imaging tab in Piktochart for detailed procedure note.     John Btoello M.D.   Vascular and Interventional Radiology   Pager: [phone]   After Hours / Scheduling: [phone]     7/23/2023  3:38 PM

## 2023-07-24 LAB
ALBUMIN SERPL BCG-MCNC: 2.6 G/DL (ref 3.5–5.2)
ALP SERPL-CCNC: 1111 U/L (ref 40–129)
ALT SERPL W P-5'-P-CCNC: 181 U/L (ref 0–70)
ANION GAP SERPL CALCULATED.3IONS-SCNC: 18 MMOL/L (ref 7–15)
AST SERPL W P-5'-P-CCNC: 167 U/L (ref 0–45)
BILIRUB DIRECT SERPL-MCNC: 1.35 MG/DL (ref 0–0.3)
BILIRUB SERPL-MCNC: 2.1 MG/DL
BUN SERPL-MCNC: 83.1 MG/DL (ref 8–23)
C3 SERPL-MCNC: 185 MG/DL (ref 81–157)
C4 SERPL-MCNC: 29 MG/DL (ref 13–39)
CALCIUM SERPL-MCNC: 8.1 MG/DL (ref 8.8–10.2)
CHLORIDE SERPL-SCNC: 91 MMOL/L (ref 98–107)
CREAT SERPL-MCNC: 11.38 MG/DL (ref 0.67–1.17)
DEPRECATED HCO3 PLAS-SCNC: 25 MMOL/L (ref 22–29)
GFR SERPL CREATININE-BSD FRML MDRD: 4 ML/MIN/1.73M2
GLUCOSE SERPL-MCNC: 81 MG/DL (ref 70–99)
HBV CORE AB SERPL QL IA: NONREACTIVE
HBV SURFACE AB SERPL IA-ACNC: 0 M[IU]/ML
HBV SURFACE AB SERPL IA-ACNC: 0.1 M[IU]/ML
HBV SURFACE AB SERPL IA-ACNC: NONREACTIVE M[IU]/ML
HBV SURFACE AB SERPL IA-ACNC: NONREACTIVE M[IU]/ML
HBV SURFACE AG SERPL QL IA: NONREACTIVE
HBV SURFACE AG SERPL QL IA: NONREACTIVE
HGB BLD-MCNC: 9.9 G/DL (ref 13.3–17.7)
HOLD SPECIMEN: NORMAL
HOLD SPECIMEN: NORMAL
PHOSPHATE SERPL-MCNC: 6.7 MG/DL (ref 2.5–4.5)
POTASSIUM SERPL-SCNC: 5.1 MMOL/L (ref 3.4–5.3)
PROT SERPL-MCNC: 5.8 G/DL (ref 6.4–8.3)
SODIUM SERPL-SCNC: 134 MMOL/L (ref 136–145)

## 2023-07-24 PROCEDURE — 84155 ASSAY OF PROTEIN SERUM: CPT | Performed by: INTERNAL MEDICINE

## 2023-07-24 PROCEDURE — 82248 BILIRUBIN DIRECT: CPT | Performed by: INTERNAL MEDICINE

## 2023-07-24 PROCEDURE — 120N000001 HC R&B MED SURG/OB

## 2023-07-24 PROCEDURE — 86481 TB AG RESPONSE T-CELL SUSP: CPT | Performed by: INTERNAL MEDICINE

## 2023-07-24 PROCEDURE — C9113 INJ PANTOPRAZOLE SODIUM, VIA: HCPCS | Mod: JZ | Performed by: INTERNAL MEDICINE

## 2023-07-24 PROCEDURE — 85018 HEMOGLOBIN: CPT | Performed by: INTERNAL MEDICINE

## 2023-07-24 PROCEDURE — 80053 COMPREHEN METABOLIC PANEL: CPT | Performed by: INTERNAL MEDICINE

## 2023-07-24 PROCEDURE — 36415 COLL VENOUS BLD VENIPUNCTURE: CPT | Performed by: INTERNAL MEDICINE

## 2023-07-24 PROCEDURE — 250N000009 HC RX 250: Performed by: INTERNAL MEDICINE

## 2023-07-24 PROCEDURE — 84100 ASSAY OF PHOSPHORUS: CPT | Performed by: INTERNAL MEDICINE

## 2023-07-24 PROCEDURE — 90937 HEMODIALYSIS REPEATED EVAL: CPT

## 2023-07-24 PROCEDURE — 5A1D70Z PERFORMANCE OF URINARY FILTRATION, INTERMITTENT, LESS THAN 6 HOURS PER DAY: ICD-10-PCS | Performed by: INTERNAL MEDICINE

## 2023-07-24 PROCEDURE — 84460 ALANINE AMINO (ALT) (SGPT): CPT | Performed by: INTERNAL MEDICINE

## 2023-07-24 PROCEDURE — 84450 TRANSFERASE (AST) (SGOT): CPT | Performed by: INTERNAL MEDICINE

## 2023-07-24 PROCEDURE — 87340 HEPATITIS B SURFACE AG IA: CPT | Performed by: INTERNAL MEDICINE

## 2023-07-24 PROCEDURE — 99233 SBSQ HOSP IP/OBS HIGH 50: CPT | Performed by: INTERNAL MEDICINE

## 2023-07-24 PROCEDURE — 86706 HEP B SURFACE ANTIBODY: CPT | Performed by: INTERNAL MEDICINE

## 2023-07-24 PROCEDURE — 84075 ASSAY ALKALINE PHOSPHATASE: CPT | Performed by: INTERNAL MEDICINE

## 2023-07-24 PROCEDURE — 90935 HEMODIALYSIS ONE EVALUATION: CPT | Performed by: PHYSICIAN ASSISTANT

## 2023-07-24 PROCEDURE — 250N000011 HC RX IP 250 OP 636: Mod: JZ | Performed by: INTERNAL MEDICINE

## 2023-07-24 PROCEDURE — 250N000013 HC RX MED GY IP 250 OP 250 PS 637: Performed by: INTERNAL MEDICINE

## 2023-07-24 PROCEDURE — 258N000002 HC RX IP 258 OP 250: Performed by: INTERNAL MEDICINE

## 2023-07-24 RX ORDER — ASPIRIN 81 MG/1
81 TABLET ORAL AT BEDTIME
Status: DISCONTINUED | OUTPATIENT
Start: 2023-07-25 | End: 2023-07-26 | Stop reason: HOSPADM

## 2023-07-24 RX ORDER — CLOPIDOGREL BISULFATE 75 MG/1
75 TABLET ORAL DAILY
Status: DISCONTINUED | OUTPATIENT
Start: 2023-07-25 | End: 2023-07-26 | Stop reason: HOSPADM

## 2023-07-24 RX ORDER — ROSUVASTATIN CALCIUM 40 MG/1
40 TABLET, COATED ORAL AT BEDTIME
Status: DISCONTINUED | OUTPATIENT
Start: 2023-07-24 | End: 2023-07-26 | Stop reason: HOSPADM

## 2023-07-24 RX ORDER — AMOXICILLIN 250 MG
1 CAPSULE ORAL 2 TIMES DAILY
Status: DISCONTINUED | OUTPATIENT
Start: 2023-07-24 | End: 2023-07-26 | Stop reason: HOSPADM

## 2023-07-24 RX ORDER — ALBUMIN (HUMAN) 12.5 G/50ML
50 SOLUTION INTRAVENOUS
Status: DISCONTINUED | OUTPATIENT
Start: 2023-07-24 | End: 2023-07-26 | Stop reason: HOSPADM

## 2023-07-24 RX ADMIN — GUAIFENESIN AND DEXTROMETHORPHAN 10 ML: 100; 10 SYRUP ORAL at 09:43

## 2023-07-24 RX ADMIN — OXYMETAZOLINE HYDROCHLORIDE 2 SPRAY: 0.05 SPRAY NASAL at 22:04

## 2023-07-24 RX ADMIN — SODIUM BICARBONATE: 84 INJECTION, SOLUTION INTRAVENOUS at 10:47

## 2023-07-24 RX ADMIN — OXYMETAZOLINE HYDROCHLORIDE 2 SPRAY: 0.05 SPRAY NASAL at 09:31

## 2023-07-24 RX ADMIN — SENNOSIDES AND DOCUSATE SODIUM 1 TABLET: 50; 8.6 TABLET ORAL at 22:04

## 2023-07-24 RX ADMIN — PANTOPRAZOLE SODIUM 40 MG: 40 INJECTION, POWDER, FOR SOLUTION INTRAVENOUS at 09:30

## 2023-07-24 RX ADMIN — ROSUVASTATIN CALCIUM 40 MG: 40 TABLET, COATED ORAL at 22:04

## 2023-07-24 RX ADMIN — SODIUM BICARBONATE: 84 INJECTION, SOLUTION INTRAVENOUS at 00:16

## 2023-07-24 ASSESSMENT — ACTIVITIES OF DAILY LIVING (ADL)
CHANGE_IN_FUNCTIONAL_STATUS_SINCE_ONSET_OF_CURRENT_ILLNESS/INJURY: NO
DIFFICULTY_EATING/SWALLOWING: NO
DOING_ERRANDS_INDEPENDENTLY_DIFFICULTY: NO
ADLS_ACUITY_SCORE: 23
VISION_MANAGEMENT: GLASSES
ADLS_ACUITY_SCORE: 23
ADLS_ACUITY_SCORE: 23
WEAR_GLASSES_OR_BLIND: YES
ADLS_ACUITY_SCORE: 23
WALKING_OR_CLIMBING_STAIRS_DIFFICULTY: NO
ADLS_ACUITY_SCORE: 23
DRESSING/BATHING_DIFFICULTY: NO
FALL_HISTORY_WITHIN_LAST_SIX_MONTHS: NO
ADLS_ACUITY_SCORE: 23
ADLS_ACUITY_SCORE: 23
DIFFICULTY_COMMUNICATING: NO
HEARING_DIFFICULTY_OR_DEAF: NO
ADLS_ACUITY_SCORE: 23
ADLS_ACUITY_SCORE: 23
TOILETING_ISSUES: NO
CONCENTRATING,_REMEMBERING_OR_MAKING_DECISIONS_DIFFICULTY: NO
ADLS_ACUITY_SCORE: 23

## 2023-07-24 NOTE — PROGRESS NOTES
Right nostril rhino rocket removed without complication. Some trickling of blood from the right nostril was controlled by oxymetazoline

## 2023-07-24 NOTE — CONSULTS
Please refer to RNCM note from today. Details on consult included.      Yanet Erwin RN on 7/24/2023 at 11:09 AM

## 2023-07-24 NOTE — PROGRESS NOTES
RENAL PROGRESS NOTE    ASSESSMENT & PLAN:   Acute kidney injury, chronic kidney disease stage 3 - With baseline creatinine 1.2-1.5 range and fairly bland UA historically. Imaging negative aside from bladder wall thickening. Current KATHY appears possibly related to severe hepatic injury, possible HRS, he also recently received contrast for CT scan. Serologies ordered. Initiated hemodialysis and dialyzing again today. Plan to follow MWF schedule for now    Hyperkalemia - Follow with correcting acidosis, initiating dialysis    Metabolic acidosis - Stopping bicarbonate now that he initiated dialysis    Anemia - Following    Leukocytosis - With on-going biliary obstruction concerns for ascending cholangitis. On antibiotics, GI following    H/o chronic biliary stricture - With history of cholecystectomy with known biliary stricture possibly secondary t bile duct injury. CT 2 weeks ago did show dilatation of intrahepatic and extrahepatic biliary ducts as well as lymphadenopathy. S/p biopsy and stent placement with ERCP on 7/21    GERD - On protonix    H/o mitral regurgitation    H/o coronary artery disease - S/p PCI in 3/2023      SUBJECTIVE:  Seen on dialysis. Feeling well, no complaints. No shortness of breath. Denies pain complaints. No cramping or dizziness    OBJECTIVE:  Physical Exam   Temp: 98.2  F (36.8  C) Temp src: Temporal BP: 116/56 Pulse: (!) 48   Resp: 22 SpO2: 96 % O2 Device: None (Room air)    Vitals:    07/22/23 0900 07/23/23 1734 07/24/23 1033   Weight: 98.3 kg (216 lb 11.2 oz) 100 kg (220 lb 7.4 oz) 100.4 kg (221 lb 5.5 oz)     Vital Signs with Ranges  Temp:  [97.7  F (36.5  C)-98.4  F (36.9  C)] 98.2  F (36.8  C)  Pulse:  [46-77] 48  Resp:  [14-27] 22  BP: (116-167)/(56-80) 116/56  SpO2:  [90 %-98 %] 96 %  I/O last 3 completed shifts:  In: 2255 [P.O.:120; I.V.:2135]  Out: 775 [Urine:775]        Patient Vitals for the past 72 hrs:   Weight   07/24/23 1033 100.4 kg (221 lb 5.5 oz)   07/23/23 1734 100  kg (220 lb 7.4 oz)   07/22/23 0900 98.3 kg (216 lb 11.2 oz)   07/21/23 1824 96.8 kg (213 lb 6.5 oz)     Intake/Output Summary (Last 24 hours) at 7/24/2023 1538  Last data filed at 7/24/2023 1210  Gross per 24 hour   Intake 2255 ml   Output 775 ml   Net 1480 ml       PHYSICAL EXAM:  General - Alert and oriented x3, appears comfortable. No apparent distress  Cardiovascular - Normal S1S2, no rub  Respiratory - Clear to auscultation bilaterally, no crackles or wheezes  Abdomen - Soft, non-tender, bowel sounds present.    Extremities - No lower extremity edema bilaterally  Integumentary - Warm, dry, no rash  Neurologic - Grossly intact, no focal deficits      LABORATORY STUDIES:     Recent Labs   Lab 07/24/23  0602 07/22/23  0718 07/21/23  1313   WBC  --  11.7* 16.1*   RBC  --  3.72* 4.08*   HGB 9.9* 11.5* 12.7*   HCT  --  34.5* 38.7*   PLT  --  262 272       Basic Metabolic Panel:  Recent Labs   Lab 07/24/23  0602 07/23/23  0639 07/22/23  0718 07/22/23  0225 07/21/23  2243 07/21/23  2127 07/21/23  2031 07/21/23  1738 07/21/23  1719 07/21/23  1633 07/21/23  1612 07/21/23  1531 07/21/23  1313   * 129* 130*  --   --   --   --   --   --   --   --   --  132*  132*   POTASSIUM 5.1 4.7 5.5*  --  5.4*  --   --   --  6.1*  --  5.8*  --  5.9*  5.9*   CHLORIDE 91* 87* 92*  --   --   --   --   --   --   --   --   --  97*  97*   CO2 25 24 19*  --   --   --   --   --   --   --   --   --  16*  16*   BUN 83.1* 108.4* 106.0*  --   --   --   --   --   --   --   --   --  99.4*  99.4*   CR 11.38* 14.00* 12.93*  --   --   --   --   --   --   --   --   --  12.36*  12.36*   GLC 81 119* 111* 88  --  80 98   < >  --    < >  --    < > 100*  100*   ESTEFANY 8.1* 8.2* 8.7*  --   --   --   --   --   --   --   --   --  9.3  9.3    < > = values in this interval not displayed.       Recent Labs   Lab Test 07/24/23  0602 07/22/23  0718 07/21/23  1719 07/21/23  1506 07/21/23  1313   INR  --  1.27* 1.28*   < >  --    WBC  --  11.7*  --   --   16.1*   HGB 9.9* 11.5*  --   --  12.7*   PLT  --  262  --   --  272    < > = values in this interval not displayed.         Personally reviewed current labs      Roya Damian PA-C  [facility]  [phone]

## 2023-07-24 NOTE — PLAN OF CARE
NURSING NOTE  0700  -  1500   Goal Outcome Evaluation:      Problem: Acute Kidney Injury/Impairment  Goal: Effective Renal Function  Outcome: Progressing    D/A: Creatinine 11.38, trending down. MArginal UOP per ponce catheter. Maintenance           fluids infusing - paused while on dialysis run per dialysis RN. Poor intakes.   R: Off unit at 1238 for dialysis.       Problem: Plan of Care - These are the overarching goals to be used throughout the patient stay.    Goal: Plan of Care Review  Outcome: Progressing    - VSS. Ramone <46bpm while on dialysis. Patient was resting/sleeping, asymptomatic when a f/u call     was made to dialysis RN.  - Hgb 9.9 in am. No bleeding noted on nares and/or dialysis cath. Urine is yellow, slightly pink tinged      with sediments. Rhinorocket removed by hospitalist around 0930 - tolerated well.  - Denies of pain/nausea. No stool since 07/20/23, per patient report. New order for stool softener in.               · Status post mastectomy   · Continue Femara

## 2023-07-24 NOTE — PROGRESS NOTES
Addendum          HEMODIALYSIS NOTE:  1st HD Tx     TREATMENT TIME: 2 hr      UF TOTAL(net) : 0 ml     BVP: 29.8         ACCESS PRE:  Blood oozing  from RCW CVC insertion site noted. Pressure DRSG applied. Blood oozing from under CVC  DRSG noted.        RUN SUMMARY: 2 hr run on a K2 bath per lab protocol.  Pt. was hemodynamically stable and alert during dialysis. See HD flow sheet for all data.  Post report given to NATACHA Bejarano RN     ACCESS POST: HD cath lumens flushed and locked with NS, capped and secured.      INTERVENTIONS: Monitor VS Q 15 minutes and PRN     PLAN: Per Renal Team

## 2023-07-24 NOTE — PLAN OF CARE
Problem: Risk for Delirium  Goal: Improved Attention and Thought Clarity  Intervention: Maximize Cognitive Function  Recent Flowsheet Documentation  Taken 7/24/2023 0023 by Carolina Hernandez, RN  Sensory Stimulation Regulation: care clustered  Reorientation Measures: clock in view   Goal Outcome Evaluation:       Pt has been alert ans calm this shift, followed orders better, still bleeding at the dialysis site, dressing changed once this shift and pressure tape put over it, no complains of pain, ponce output is red, house aware about and asked to monitor till check up is done today, /77. Dialysis site dressing soiled with blood, dressing changed at 6:25 AM,

## 2023-07-24 NOTE — PROGRESS NOTES
[facility]    Medicine Progress Note - Hospitalist Service    Date of Admission:  7/21/2023    Assessment & Plan      Rafael Santillan is a 68 year old male admitted on 7/21/2023. He presented with 2 weeks of off-and-on abdominal pain nausea and vomiting was found to have acute renal failure, hyperkalemia, elevated liver enzymes.  Nephrology and GI consulted.  ERCP done on 7/22, biliary stent placed.  Creatinine is worsening with oliguria.  Cordero's catheter inserted on 7/2.  Patient has also epistaxis and t alert was controlled with Rhino Rocket and oxymetazoline spray.  Desmopressin/DDAVP given by nephrology on 7/23 for dialysis catheter bleeding.  Monitor hemoglobin.  Resume aspirin and Plavix on 7/25 if no further episodes of bleeding    Abdominal pain and elevated liver enzyme likely due to biliary stricture  --Elevated liver enzymes on admission--  --Biliary stent placed on 7/22.  LFTs are trending down.  Biopsy results are pending.  --Acute hepatitis panel is negative  -ERCP done on 7/22  -- Off Zosyn since 7/23  --Currently on bicarb drip    Acute renal failure with hyperkalemia.  Hyperkalemia is improving  Changed to oliguric.  First dialysis on 7/23.  Another round on 7/24.  Metabolic acidosis  --Cordero's catheter inserted on 7/23 due to urinary retention and strict output measurement.  May remove it next a.m.  --NIK is pending, C3-C4 unremarkable  --Baseline creatinine 1.3  Hold losartan, but restart statin aspirin Plavix next a.m. if no further episodes of epistaxis  -- Total CK is normal    Acute epistaxis bilateral nostril-7/23  Likely related to platelet dysfunction-from uremia  --Patient does have history of recurrent nosebleeds.  -Hemoglobin dropped to 9.8.  Continue to monitor hemoglobin.  -- Bleeding controlled with Rhino Rocket and oxymetazoline.  Rhino Rocket removed on 7/24  -- Resume aspirin Plavix on 7/25.  Resume statin on 7/24    History of CAD with FELICIA in March 2023 to  "RCA  -- Aspirin, Plavix was on hold for epistaxis, ERCP and dialysis catheter insertion.  -- Resume aspirin, Plavix on 7/25 if no epistaxis      Anemia multifactorial due to nosebleeds, bleeding from dialysis catheter site  -- Hemoglobin dropped to 9.8.  -- Continue to monitor hemoglobin and transfuse if hemoglobin less than 8    GERD.    -Continue home medications  -Continue IV Protonix    Constipation  -- Order senna docusate twice daily    Hyponatremia likely due to hypervolemia and renal failure  Now improving s/p dialysis    Leukocytosis likely reactive  --Improving.  Recheck WBC next a.m.  Urine culture is negative and therefore DC Zosyn on 7/23    History of mitral regurgitation  --Does not appear to be fluid overloaded  --Monitor fluid status every day to avoid hypervolemia       Diet: Renal Diet (dialysis)    DVT Prophylaxis: Low Risk/Ambulatory with no VTE prophylaxis indicated  Cordero Catheter: PRESENT, indication: Retention  Lines: PRESENT      CVC Double Lumen Right Subclavian-Site Assessment: Bleeding;Tender;Edematous      Cardiac Monitoring: None  Code Status: Full Code      Clinically Significant Risk Factors         # Hyponatremia: Lowest Na = 129 mmol/L in last 2 days, will monitor as appropriate      # Hypoalbuminemia: Lowest albumin = 2.6 g/dL at 7/24/2023  6:02 AM, will monitor as appropriate  # Coagulation Defect: INR = 1.27 (Ref range: 0.85 - 1.15) and/or PTT = 32 Seconds (Ref range: 22 - 38 Seconds), will monitor for bleeding               # Obesity: Estimated body mass index is 31.76 kg/m  as calculated from the following:    Height as of this encounter: 1.778 m (5' 10\").    Weight as of this encounter: 100.4 kg (221 lb 5.5 oz).  , PRESENT ON ADMISSION          Disposition Plan      Expected Discharge Date: 07/25/2023      Destination: home with family  Discharge Comments: home with family          Jian Jeronimo MD  Hospitalist Service  [facility]  Securely message " with Vocera (more info)  Text page via [facility] Paging/Directory   ______________________________________________________________________    Interval History   Patient requesting stool softener.  Ordered senna docusate.  Rhino Rocket removed from right nostril.  Oxymetazoline spray used for oozing of blood after Rhino Rocket was available.  No further epistaxis.  Aspirin Plavix on hold due to ERCP, epistaxis and dialysis catheter insertion.  Will resume Plavix next a.m. and if tolerates then add aspirin.  Patient had stent to RCA in March 2023.  High risk for restenosis when off aspirin and Plavix.  Discussed with the patient.  Patient will undergo second round of dialysis today.  Cordero's catheter in place.  Patient feels hungry and wants to eat.  Denies any abdominal pain.  LFTs and creatinine are trending down.  Mild improvement in sodium levels.  No family member at bedside.  Physical Exam   Vital Signs: Temp: 98.2  F (36.8  C) Temp src: Temporal BP: 129/63 Pulse: 51   Resp: 14 SpO2: 93 % O2 Device: None (Room air) Oxygen Delivery: 2 LPM  Weight: 221 lbs 5.47 oz urine output of 564    Rhino Rocket in the right nostril removed.  No epistaxis  Bowel sounds are positive  Did not elicit tenderness due to patient's discomfort  Normal mood and affect      Medical Decision Making       55 MINUTES SPENT BY ME on the date of service doing chart review, history, exam, documentation & further activities per the note.  MANAGEMENT DISCUSSED with the following over the past 24 hours: Patient   NOTE(S)/MEDICAL RECORDS REVIEWED over the past 24 hours: Nursing      Data     I have personally reviewed the following data over the past 24 hrs:    N/A  \   9.9 (L)   / N/A     134 (L) 91 (L) 83.1 (H) /  81   5.1 25 11.38 (H) \     ALT: 181 (H) AST: 167 (H) AP: 1,111 (H) TBILI: 2.1 (H)   ALB: 2.6 (L) TOT PROTEIN: 5.8 (L) LIPASE: N/A

## 2023-07-24 NOTE — DISCHARGE INSTRUCTIONS
Tunneled Central Catheter Discharge Instructions:  You had a tunneled central access device placed. Part of the device is outside of your body and part of the device runs under the skin into a vein. Your nurses and doctors will use the tunneled catheter for your future treatments.    Care Instructions:   - If you received sedation for your procedure, do not drive or operate heavy machinery for the rest of the day.  - Keep dialysis catheter site dry. Do not get wet.  - Never immerse your catheter in water; no swimming, hot tubs, or tub baths.  - If you experience significant bleeding at site, apply pressure with hands above the clavicle bone, sit upright.     If the catheter (tubing) breaks or leaks:  - Place the blue emergency clamp between the break and your insertion site on your skin  - If you don't have a clamp, fold or pinch off the tubing above the hole or break in the catheter (closest to your skin)  - Call [facility] at [phone]     Seek medical assistance for any of the following:   - Uncontrolled bleeding.  - You have a fever (greater than 101 F (38.3C)).  - Purulent (yellow/green/foul smelling) drainage from catheter insertion site.  - Increasing redness at catheter site.  - Increasing pain at catheter site.  - Increasing swelling at catheter site.  - If you have chest pain, shortness of breath, or feel faint:  -Make sure end caps are securely tightened  -Look for a hole or leaking in the catheter  -Put the blue emergency clamp on the catheter (tubing) above the hole or break in the catheter as close to the skin as you can  -Remain calm and call 911. Explain your symptoms and say that you have a central line tunnel catheter in place  -Lie on your left side       Outpatient Dialysis  [facility]  Monday/Wednesday/Friday  Please arrive at 2:45pm; first appointment is set for 7/28/2023 (this Friday).    Paxton Sandra  [address]  [place]  [address]  [phone]

## 2023-07-24 NOTE — PROGRESS NOTES
Care Management Follow Up    Length of Stay (days): 3    Expected Discharge Date: 07/25/2023     Concerns to be Addressed: discharge planning      Patient plan of care discussed at interdisciplinary rounds: Yes    Anticipated Discharge Disposition:       Anticipated Discharge Services:    Education Provided on the Discharge Plan:    Patient/Family in Agreement with the Plan:      Referrals Placed by CM/SW:    Private pay costs discussed: Not applicable    Additional Information:  Chart reviewed.    Cm updates:  Per Neph outpatient dialysis needs to be set up. Pt prefers [facility] 1st choice,and [facility] second choice.  Writer had to pick 3 choices so writer, also picked [facility] since it was closest to pts home besides [facility] and [facility].    Writer faxed all documents needed except the TB Gold test this morning. TB Gold Test is still processing.    In portal is stated pt has a reserved spot on M/W/F Afternoon at [facility].    In the portal application is still pending full approval until TB Gold Test is added, and provider's orders.       Social Hx:  Lives in a house with his significant other. Independent with ADLs and IADLs. Likely home with no new needs. Family to transport.      Cm will continue to follow plan of care, review recommendations, and assist with any discharge needs anticipated.       Yanet Erwin RN

## 2023-07-24 NOTE — PLAN OF CARE
Problem: Urinary Retention  Goal: Effective Urinary Elimination  Outcome: Progressing     Problem: Plan of Care - These are the overarching goals to be used throughout the patient stay.    Goal: Absence of Hospital-Acquired Illness or Injury  Intervention: Identify and Manage Fall Risk  Recent Flowsheet Documentation  Taken 7/23/2023 1600 by Roxanne Bejarano, RN  Safety Promotion/Fall Prevention: assistive device/personal items within reach   Goal Outcome Evaluation:         A/Ox4. VSS ex elevated BP. On RA. Denies pain. Up SBA. Tele NSR for dialysis. First run of dialysis with no fluid off. Plan for another round tomorrow. L PIV infusing 1/2NS with sodium bicarb at 100ml/hr. Clear liquid diet. Dialysis catheter placed by IR and bleeding and oozing noted from site, Dr Mckeon and Dr Botello aware and following patient. Pressure dressing placed over site per Dr Mckeon and Dr Botello. Patient refused being up at 90 degree in bed. Cordero patent with pink to redish output. Rhinorocket in R nostril in place. GI following. Nephrology following. IR following. Nursing to continue to monitor.

## 2023-07-24 NOTE — PROVIDER NOTIFICATION
MD Notification    Person notified:MD    Person Name:Dr Botello    Date/Time:7/23/23@7:42PM    Interaction:phone call    Purpose of Notification:bleeding at dialysis catheter catheter port site and insertion site    Orders Received:    Comments:Spoke to Dr Botello, bleeding and oozing at site is expected, extra sutures were placed and Dr Mckeon with Nephrology is aware. To minimize bleeding, can keep patient at 90 degrees and apply pressure dressing. IR will follow in the morning.

## 2023-07-24 NOTE — PROGRESS NOTES
[facility] progress Note    Subjective: Patient denies any abdominal pain or GI complaints.    Objective:  Vital signs in last 24 hours  Temp:  [97.7  F (36.5  C)-98.4  F (36.9  C)] 98.1  F (36.7  C)  Pulse:  [60-77] 66  Resp:  [8-20] 18  BP: (123-167)/(65-80) 135/66  SpO2:  [90 %-98 %] 95 %     Gen: Awake, no acute distress  Gastrointestinal: Soft, non-tender,     Patient Active Problem List   Diagnosis    RUQ abdominal pain    Biliary stricture    Abnormal liver enzymes    Acute renal failure, unspecified acute renal failure type (H)    Nausea and vomiting, unspecified vomiting type       Labs:  CMP Results:   Recent Labs   Lab Test 07/23/23  0639 07/22/23 0718   * 130*   POTASSIUM 4.7 5.5*   CHLORIDE 87* 92*   CO2 24 19*   ANIONGAP 18* 19*   * 111*   .4* 106.0*   CR 14.00* 12.93*   BILITOTAL  --  4.2*   ALKPHOS  --  1,584*   ALT  --  274*   AST  --  309*      CBC  Recent Labs   Lab 07/24/23  0602 07/22/23  0718 07/21/23  1313   WBC  --  11.7* 16.1*   RBC  --  3.72* 4.08*   HGB 9.9* 11.5* 12.7*   HCT  --  34.5* 38.7*   MCV  --  93 95   MCH  --  30.9 31.1   MCHC  --  33.3 32.8   RDW  --  14.6 14.8   PLT  --  262 272       INR  Recent Labs   Lab 07/22/23  0718 07/21/23  1719 07/21/23  1506   INR 1.27* 1.28* 1.24*        Lipase   Date Value Ref Range Status   07/21/2023 841 (H) 13 - 60 U/L Final     Recent Labs   Lab 07/24/23  0602 07/22/23  0718 07/21/23  1313   * 309* 339*   * 274* 311*   ALKPHOS 1,111* 1,584* 1,870*   BILITOTAL 2.1* 4.2* 4.0*   LIPASE  --   --  841*       CT scan on 7/21/2003 showing  IMPRESSION: 1.  Circumferential urinary bladder wall thickening could be secondary to underdistention or cystitis. Correlate with urinalysis. 2.  Sequelae of chronic biliary stricture in the left hepatic lobe with associated mild intrahepatic biliary ductal dilatation and parenchymal atrophy.   ERCP on 1/23/2013 showing  Impressions/Post-Op Diagnosis:        - Three  stents were removed from the biliary tree.        - A moderate localized biliary stricture was found. The stricture was        post-surgical, benign appearing and fibrotic. Good flow of contrast was        noted readily across the patent lumen of the stricture. The stricture        was biopsied.        - Choledocholithiasis was found. Complete removal was accomplished by        sweeping.                                                                                   Recommendation:        - Perform MRCP in 3 months.        - Check liver enzymes (AST, ALT, Alkaline Phosphatase,   Bilirubin) at      appointment to be scheduled.        - Observe patient's clinical course.        - Watch for pancreatitis, bleeding, perforation, and cholangitis.    MRCP on 4/15/2013 showing  1. Presumed proximal biliary stricture left lateral segment, with associated mild to moderate intrahepatic biliary ductal dilatation and atrophy which should be related to long-standing biliary obstruction.   2. Mild short segment stricture common hepatic duct just below a normal variant trifurcation.   3. No evidence of retained stone.   4. Fatty liver.    ERCP on 7/21/2023 showing :  A single localized biliary stricture was found in                          the lower third of the main bile duct. The stricture                          was indeterminate. This was biopsied and aspirated and                          the fluid was sent for cytology. This stricture was                          treated with dilation and stent placement.   Recommendation:        - Return patient to hospital [facility] for ongoing care.                          - Await pathology results.                          - EUS and stent exchange in the next couple of weeks.       Assessment:   68 year old male with history of coronary artery disease s/p stenting, NSTEMI, s/p cholecystectomy, known history of biliary strictures s/p ERCPs with stenting in the past.  Elevated LFTs likely  from the biliary stricture.  S/p ERCP with stent placement and biopsies on 7/21/2023.  Clinically doing okay.  Acute renal failure.  Plan for dialysis per nephrology.  LFTs trending down    Plan:   Await ERCP pathology results.  Patient's LFTs should be followed by his PCP as outpatient to confirm continued downward trend  EUS and stent exchange in the next couple of weeks - our office will call patient to schedule  Will sign off. Please call with questions.    Total time spent was 25 minutes    Michael Siegel MD

## 2023-07-24 NOTE — PROGRESS NOTES
1L removed during 3Hr HD Tx.     Tolerated well.      CVC dressing cleaned and changed.  Bleeding stopped       See flow sheet for Tx Data     Report given to MARIAN Perla RN

## 2023-07-25 LAB
ALBUMIN SERPL BCG-MCNC: 2.5 G/DL (ref 3.5–5.2)
ANA SER QL IF: NEGATIVE
ANION GAP SERPL CALCULATED.3IONS-SCNC: 12 MMOL/L (ref 7–15)
BUN SERPL-MCNC: 53.9 MG/DL (ref 8–23)
CALCIUM SERPL-MCNC: 7.8 MG/DL (ref 8.8–10.2)
CHLORIDE SERPL-SCNC: 92 MMOL/L (ref 98–107)
CREAT SERPL-MCNC: 8.16 MG/DL (ref 0.67–1.17)
DEPRECATED HCO3 PLAS-SCNC: 30 MMOL/L (ref 22–29)
ERYTHROCYTE [DISTWIDTH] IN BLOOD BY AUTOMATED COUNT: 13.9 % (ref 10–15)
GAMMA INTERFERON BACKGROUND BLD IA-ACNC: 0 IU/ML
GFR SERPL CREATININE-BSD FRML MDRD: 7 ML/MIN/1.73M2
GLUCOSE SERPL-MCNC: 113 MG/DL (ref 70–99)
HCT VFR BLD AUTO: 27.3 % (ref 40–53)
HGB BLD-MCNC: 9.1 G/DL (ref 13.3–17.7)
M TB IFN-G BLD-IMP: ABNORMAL
M TB IFN-G CD4+ BCKGRND COR BLD-ACNC: 0.02 IU/ML
MCH RBC QN AUTO: 30.6 PG (ref 26.5–33)
MCHC RBC AUTO-ENTMCNC: 33.3 G/DL (ref 31.5–36.5)
MCV RBC AUTO: 92 FL (ref 78–100)
MITOGEN IGNF BCKGRD COR BLD-ACNC: 0 IU/ML
MITOGEN IGNF BCKGRD COR BLD-ACNC: 0 IU/ML
PATH REPORT.COMMENTS IMP SPEC: NORMAL
PATH REPORT.COMMENTS IMP SPEC: NORMAL
PATH REPORT.FINAL DX SPEC: NORMAL
PATH REPORT.GROSS SPEC: NORMAL
PATH REPORT.MICROSCOPIC SPEC OTHER STN: NORMAL
PATH REPORT.RELEVANT HX SPEC: NORMAL
PHOSPHATE SERPL-MCNC: 5 MG/DL (ref 2.5–4.5)
PHOTO IMAGE: NORMAL
PLATELET # BLD AUTO: 193 10E3/UL (ref 150–450)
POTASSIUM SERPL-SCNC: 4.4 MMOL/L (ref 3.4–5.3)
QUANTIFERON MITOGEN: 0.02 IU/ML
QUANTIFERON NIL TUBE: 0 IU/ML
QUANTIFERON TB1 TUBE: 0 IU/ML
QUANTIFERON TB2 TUBE: 0
RBC # BLD AUTO: 2.97 10E6/UL (ref 4.4–5.9)
SODIUM SERPL-SCNC: 134 MMOL/L (ref 136–145)
WBC # BLD AUTO: 11.6 10E3/UL (ref 4–11)

## 2023-07-25 PROCEDURE — 120N000001 HC R&B MED SURG/OB

## 2023-07-25 PROCEDURE — 82040 ASSAY OF SERUM ALBUMIN: CPT | Performed by: INTERNAL MEDICINE

## 2023-07-25 PROCEDURE — 250N000009 HC RX 250: Performed by: INTERNAL MEDICINE

## 2023-07-25 PROCEDURE — 99232 SBSQ HOSP IP/OBS MODERATE 35: CPT | Performed by: PHYSICIAN ASSISTANT

## 2023-07-25 PROCEDURE — 99232 SBSQ HOSP IP/OBS MODERATE 35: CPT | Performed by: INTERNAL MEDICINE

## 2023-07-25 PROCEDURE — 250N000011 HC RX IP 250 OP 636: Mod: JZ | Performed by: INTERNAL MEDICINE

## 2023-07-25 PROCEDURE — 250N000013 HC RX MED GY IP 250 OP 250 PS 637: Performed by: INTERNAL MEDICINE

## 2023-07-25 PROCEDURE — 258N000002 HC RX IP 258 OP 250: Performed by: INTERNAL MEDICINE

## 2023-07-25 PROCEDURE — 36415 COLL VENOUS BLD VENIPUNCTURE: CPT | Performed by: INTERNAL MEDICINE

## 2023-07-25 PROCEDURE — C9113 INJ PANTOPRAZOLE SODIUM, VIA: HCPCS | Mod: JZ | Performed by: INTERNAL MEDICINE

## 2023-07-25 PROCEDURE — 85027 COMPLETE CBC AUTOMATED: CPT | Performed by: INTERNAL MEDICINE

## 2023-07-25 RX ORDER — CALCIUM CARBONATE 500 MG/1
500 TABLET, CHEWABLE ORAL DAILY PRN
Status: DISCONTINUED | OUTPATIENT
Start: 2023-07-25 | End: 2023-07-26 | Stop reason: HOSPADM

## 2023-07-25 RX ADMIN — Medication 81 MG: at 20:46

## 2023-07-25 RX ADMIN — SODIUM BICARBONATE: 84 INJECTION, SOLUTION INTRAVENOUS at 11:42

## 2023-07-25 RX ADMIN — PANTOPRAZOLE SODIUM 40 MG: 40 INJECTION, POWDER, FOR SOLUTION INTRAVENOUS at 08:40

## 2023-07-25 RX ADMIN — CLOPIDOGREL BISULFATE 75 MG: 75 TABLET, FILM COATED ORAL at 08:40

## 2023-07-25 RX ADMIN — SODIUM BICARBONATE: 84 INJECTION, SOLUTION INTRAVENOUS at 00:31

## 2023-07-25 RX ADMIN — ROSUVASTATIN CALCIUM 40 MG: 40 TABLET, COATED ORAL at 20:46

## 2023-07-25 ASSESSMENT — ACTIVITIES OF DAILY LIVING (ADL)
ADLS_ACUITY_SCORE: 23
ADLS_ACUITY_SCORE: 26
ADLS_ACUITY_SCORE: 23
ADLS_ACUITY_SCORE: 23
ADLS_ACUITY_SCORE: 26
ADLS_ACUITY_SCORE: 23
ADLS_ACUITY_SCORE: 23
ADLS_ACUITY_SCORE: 26

## 2023-07-25 NOTE — PROGRESS NOTES
Care Management Follow Up    Length of Stay (days): 4    Expected Discharge Date: 07/25/2023     Concerns to be Addressed:    OP dialysis set up   Patient plan of care discussed at interdisciplinary rounds: Yes    Anticipated Discharge Disposition:       Anticipated Discharge Services:    Anticipated Discharge DME:      Patient/family educated on Medicare website which has current facility and service quality ratings:    Education Provided on the Discharge Plan:    Patient/Family in Agreement with the Plan:      Referrals Placed by CM/SW:    Private pay costs discussed: Not applicable    Additional Information:  Social Hx:  Lives in a house with his significant other. Independent with ADLs and IADLs. Likely home with no new needs. Family to transport.    CM uploaded the TB results on the [employer] Portal, per [employer] team they are sending results to the [facility] to get the final chair time.    CATHLEEN asked to speak to the pt. CATHLEEN went into pt room and he asked CM if I could speak to his work. I told him we don't typically get involved with pt employment but that I could see if the provider would be willing to write a doctor's note and I could fax this to his employer. I also spoke to him that we are waiting to hear back from the OP dialysis center about the chair time and he looked very confused and asked me why he would need dialysis outpatient, he told me no one has talked to him about OP dialysis. CATHLEEN then reached out to the provider and LVM with nephrology to please give the pt more details on his discharge plan.    Cuca Mercado RN

## 2023-07-25 NOTE — PROGRESS NOTES
RENAL PROGRESS NOTE    ASSESSMENT & PLAN:   Acute kidney injury, chronic kidney disease stage 3 - With baseline creatinine 1.2-1.5 range and fairly bland UA historically. Imaging negative aside from bladder wall thickening. Current KATHY appears possibly related to severe hepatic injury, possible HRS, he also recently received contrast for CT scan. Serologies ordered. Initiated hemodialysis and dialyzed Sunday-Monday. Assuming MWF schedule for now    Hyperkalemia - Follow with correcting acidosis, initiating dialysis    Metabolic acidosis - Stopping bicarbonate now that he initiated dialysis    Anemia - Following    Leukocytosis - With on-going biliary obstruction concerns for ascending cholangitis. On antibiotics, GI following    H/o chronic biliary stricture - With history of cholecystectomy with known biliary stricture possibly secondary t bile duct injury. CT 2 weeks ago did show dilatation of intrahepatic and extrahepatic biliary ducts as well as lymphadenopathy. S/p biopsy and stent placement with ERCP on 7/21    GERD - On protonix    H/o mitral regurgitation    H/o coronary artery disease - S/p PCI in 3/2023      SUBJECTIVE:  Feeling well. Asking why he can't go home today. No nausea or vomiting. No shortness of breath. Tolerating dialysis well    OBJECTIVE:  Physical Exam   Temp: 98.4  F (36.9  C) Temp src: Oral BP: 136/74 Pulse: 50   Resp: 18 SpO2: 92 % O2 Device: None (Room air)    Vitals:    07/23/23 1734 07/24/23 1033 07/25/23 0748   Weight: 100 kg (220 lb 7.4 oz) 100.4 kg (221 lb 5.5 oz) 100.8 kg (222 lb 3.6 oz)     Vital Signs with Ranges  Temp:  [97.7  F (36.5  C)-98.4  F (36.9  C)] 98.4  F (36.9  C)  Pulse:  [46-53] 50  Resp:  [14-29] 18  BP: (116-156)/(56-74) 136/74  SpO2:  [92 %-98 %] 92 %  I/O last 3 completed shifts:  In: 2715 [P.O.:320; I.V.:2395]  Out: 1750 [Urine:750; Other:1000]        Patient Vitals for the past 72 hrs:   Weight   07/25/23 0748 100.8 kg (222 lb 3.6 oz)   07/24/23 1033  100.4 kg (221 lb 5.5 oz)   07/23/23 1734 100 kg (220 lb 7.4 oz)       Intake/Output Summary (Last 24 hours) at 7/24/2023 1538  Last data filed at 7/24/2023 1210  Gross per 24 hour   Intake 2255 ml   Output 775 ml   Net 1480 ml       PHYSICAL EXAM:  General - Alert, appears comfortable. No apparent distress  Cardiovascular - Normal S1S2, no rub  Respiratory - Clear to auscultation bilaterally, no crackles or wheezes  Abdomen - Soft, non-tender, bowel sounds present.    Extremities - No lower extremity edema bilaterally  Integumentary - Warm, dry, no rash  Neurologic - Grossly intact, no focal deficits      LABORATORY STUDIES:     Recent Labs   Lab 07/25/23  0605 07/24/23  0602 07/22/23  0718 07/21/23  1313   WBC 11.6*  --  11.7* 16.1*   RBC 2.97*  --  3.72* 4.08*   HGB 9.1* 9.9* 11.5* 12.7*   HCT 27.3*  --  34.5* 38.7*     --  262 272         Basic Metabolic Panel:  Recent Labs   Lab 07/25/23  0605 07/24/23  0602 07/23/23  0639 07/22/23  0718 07/22/23  0225 07/21/23  2243 07/21/23  2127 07/21/23  1738 07/21/23  1719 07/21/23  1531 07/21/23  1313   * 134* 129* 130*  --   --   --   --   --   --  132*  132*   POTASSIUM 4.4 5.1 4.7 5.5*  --  5.4*  --   --  6.1*   < > 5.9*  5.9*   CHLORIDE 92* 91* 87* 92*  --   --   --   --   --   --  97*  97*   CO2 30* 25 24 19*  --   --   --   --   --   --  16*  16*   BUN 53.9* 83.1* 108.4* 106.0*  --   --   --   --   --   --  99.4*  99.4*   CR 8.16* 11.38* 14.00* 12.93*  --   --   --   --   --   --  12.36*  12.36*   * 81 119* 111* 88  --  80   < >  --    < > 100*  100*   ESTEFANY 7.8* 8.1* 8.2* 8.7*  --   --   --   --   --   --  9.3  9.3    < > = values in this interval not displayed.         Recent Labs   Lab Test 07/25/23  0605 07/24/23  0602 07/22/23  0718 07/21/23  1719   INR  --   --  1.27* 1.28*   WBC 11.6*  --  11.7*  --    HGB 9.1* 9.9* 11.5*  --      --  262  --            Personally reviewed current labs      Roya Rock  [facility]  [phone]

## 2023-07-25 NOTE — PLAN OF CARE
Problem: Plan of Care - These are the overarching goals to be used throughout the patient stay.    Goal: Absence of Hospital-Acquired Illness or Injury  Intervention: Identify and Manage Fall Risk  Recent Flowsheet Documentation  Safety Promotion/Fall Prevention:   patient and family education   nonskid shoes/slippers when out of bed   clutter free environment maintained     Problem: Acute Kidney Injury/Impairment  Goal: Effective Renal Function  Intervention: Monitor and Support Renal Function  Recent Flowsheet Documentation  Medication Review/Management: medications reviewed   Goal Outcome Evaluation    Denied discomfort of any kind.  CVC dressing remain CDI.  No nose bleed.  Urine output remains pink-tinged 275ccs out.  Had Dialysis run this afternoon/evening.    Tolerated renal diet.  Ate 75%  Inquiring when he will be discharged.  Slept intermittently.  Cont to monitor.

## 2023-07-25 NOTE — PLAN OF CARE
Problem: Risk for Delirium  Goal: Optimal Coping  Outcome: Progressing   Goal Outcome Evaluation:  Pt is alert and oriented, able to make needs known. Pt is up independently in his room. Denies pain. Pt has been resting when rounded upon. Cordero removed this morning. Uneventful shift.  Dalila Marmolejo RN

## 2023-07-25 NOTE — PLAN OF CARE
NURSING NOTE  0700  -  1500   Goal Outcome Evaluation:    Problem: Acute Kidney Injury/Impairment; Goal: Effective Renal Function  Problem: Urinary Retention; Goal: Effective Urinary Elimination  Outcome: Progressing    Creatinine trending down. Since catheter was removed @ 0613, he has voided 125mL.  Bladder scans: random 133mL, PVR 149mL. Hospitalist notified, no response yet.  On IV fluids. Poor food/fluid intakes. No appetite.  No signs of active bleeding.     Problem: Plan of Care - These are the overarching goals to be used throughout the patient stay.    Goal: Plan of Care Review  Outcome: Progressing    VSS, on room air.   Patient reported of stool x3 earlier in the day. Stool softener held.   Independent. Stable gait.   CM is coordinating set-up for outpatient dialysis.

## 2023-07-25 NOTE — PROGRESS NOTES
[facility]    Medicine Progress Note - Hospitalist Service    Date of Admission:  7/21/2023    Assessment & Plan      Rafael Santillan is a 68 year old male admitted on 7/21/2023. He presented with 2 weeks of off-and-on abdominal pain nausea and vomiting was found to have acute renal failure, hyperkalemia, elevated liver enzymes.  Nephrology and GI consulted.  ERCP done on 7/22, biliary stent placed.  Creatinine is worsening with oliguria.  Cordero's catheter inserted on 7/2.  Patient has also epistaxis and t alert was controlled with Rhino Rocket and oxymetazoline spray.  Desmopressin/DDAVP given by nephrology on 7/23 for dialysis catheter bleeding.  Monitor hemoglobin.  Resume aspirin and Plavix on 7/25 if no further episodes of bleeding    Abdominal pain and elevated liver enzyme likely due to biliary stricture  Elevated liver enzymes on admission--  CP/biliary stent placed on 7/22.  LFTs are trending down.  Biopsy results are pending.  Acute hepatitis panel is negative  Off Zosyn since 7/23    Acute renal failure with hyperkalemia.  Hyperkalemia resolved.  S/p bicarbonate drip for metabolic acidosis.  S/p Cordero's catheter 7/23-25 due to urinary retention and strict output measurement.    NIK is pending, C3-C4, CPK unremarkable.  Baseline creatinine 1.3.  Nephrology planning for outpatient MWF dialysis.  Hold losartan, aspirin and Plavix were restarted since no recurrence of epistaxis.    Acute epistaxis bilateral nostril-7/23  Likely related to platelet dysfunction-from uremia  Patient does have history of recurrent nosebleeds.  Hemoglobin dropped to 9.1.  Continue to monitor hemoglobin.  S/p Rhino Rocket and oxymetazoline.  Rhino Rocket removed on 7/24  Resumed aspirin Plavix on 7/25.  Resumed statin on 7/24    History of CAD with FELICIA in March 2023 to RCA  Aspirin, Plavix was on hold for epistaxis, resumed now.      Anemia multifactorial due to nosebleeds, bleeding from dialysis catheter  "site  Continue to monitor hemoglobin and transfuse if hemoglobin less than 8    GERD-on PPI.      Constipation -on bowel regimen.    Hyponatremia likely due to hypervolemia and renal failure, improved with dialysis.    Leukocytosis likely reactive, improved, remains very mild at 11.6.  Urine culture is negative and therefore DC Zosyn on 7/23    History of mitral regurgitation  --Does not appear to be fluid overloaded  --Monitor fluid status every day to avoid hypervolemia       Diet: Renal Diet (dialysis)    DVT Prophylaxis: Low Risk/Ambulatory with no VTE prophylaxis indicated  Cordero Catheter: Not present  Lines: PRESENT      CVC Double Lumen Right Subclavian-Site Assessment: WDL      Cardiac Monitoring: None  Code Status: Full Code      Clinically Significant Risk Factors              # Hypoalbuminemia: Lowest albumin = 2.5 g/dL at 7/25/2023  6:05 AM, will monitor as appropriate     # Obesity: Estimated body mass index is 31.89 kg/m  as calculated from the following:    Height as of this encounter: 1.778 m (5' 10\").    Weight as of this encounter: 100.8 kg (222 lb 3.6 oz).  PRESENT ON ADMISSION       Disposition Plan   Expected Discharge Date: 07/26/2023      Destination: home with family  Discharge Comments: home with family        Rebeca Hernandez MD  Hospitalist Service  [facility]  Securely message with [employer] (more info)  Text page via [employer] Paging/Directory   ______________________________________________________________________    Interval History   Patient new to me.  Chart reviewed.  Patient was seen and examined.  Cordero catheter removed today, voided small amount of urine.  No recurrence of epistaxis.  No chest pain, cardiac l palpitations, dyspnea, cough, fever, dysuria.  Creatinine remains elevated, 7.8 today.  No family member at bedside.    Physical Exam   Vital Signs: Temp: 98.1  F (36.7  C) Temp src: Oral BP: 133/75 Pulse: 52   Resp: 18 SpO2: 95 % O2 Device: None (Room air) "    Weight: 222 lbs 3.58 oz urine output of 564  Lungs clear to auscultation bilaterally.  Heart with rhythmic and regular S1-S2.  Abdomen soft, nontender.  Audible bowel sounds.  Normal mood and affect    Medical Decision Making       35 MINUTES SPENT BY ME on the date of service doing chart review, history, exam, documentation & further activities per the note.  MANAGEMENT DISCUSSED with the following over the past 24 hours: Patient   NOTE(S)/MEDICAL RECORDS REVIEWED over the past 24 hours: Nursing      Data     I have personally reviewed the following data over the past 24 hrs:    11.6 (H)  \   9.1 (L)   / 193     134 (L) 92 (L) 53.9 (H) /  113 (H)   4.4 30 (H) 8.16 (H) \     ALT: N/A AST: N/A AP: N/A TBILI: N/A   ALB: 2.5 (L) TOT PROTEIN: N/A LIPASE: N/A

## 2023-07-26 VITALS
WEIGHT: 222.22 LBS | OXYGEN SATURATION: 96 % | DIASTOLIC BLOOD PRESSURE: 78 MMHG | TEMPERATURE: 98.8 F | RESPIRATION RATE: 21 BRPM | HEIGHT: 70 IN | HEART RATE: 57 BPM | SYSTOLIC BLOOD PRESSURE: 162 MMHG | BODY MASS INDEX: 31.81 KG/M2

## 2023-07-26 LAB
ALBUMIN SERPL BCG-MCNC: 2.9 G/DL (ref 3.5–5.2)
ALP SERPL-CCNC: 893 U/L (ref 40–129)
ALT SERPL W P-5'-P-CCNC: 73 U/L (ref 0–70)
ANION GAP SERPL CALCULATED.3IONS-SCNC: 13 MMOL/L (ref 7–15)
AST SERPL W P-5'-P-CCNC: 86 U/L (ref 0–45)
BILIRUB SERPL-MCNC: 2.1 MG/DL
BUN SERPL-MCNC: 62.7 MG/DL (ref 8–23)
CALCIUM SERPL-MCNC: 8.6 MG/DL (ref 8.8–10.2)
CHLORIDE SERPL-SCNC: 94 MMOL/L (ref 98–107)
CREAT SERPL-MCNC: 9.77 MG/DL (ref 0.67–1.17)
DEPRECATED HCO3 PLAS-SCNC: 28 MMOL/L (ref 22–29)
ERYTHROCYTE [DISTWIDTH] IN BLOOD BY AUTOMATED COUNT: 13.6 % (ref 10–15)
GFR SERPL CREATININE-BSD FRML MDRD: 5 ML/MIN/1.73M2
GLUCOSE SERPL-MCNC: 96 MG/DL (ref 70–99)
HCT VFR BLD AUTO: 27.4 % (ref 40–53)
HGB BLD-MCNC: 9.1 G/DL (ref 13.3–17.7)
IRON SERPL-MCNC: 46 UG/DL (ref 61–157)
MCH RBC QN AUTO: 31 PG (ref 26.5–33)
MCHC RBC AUTO-ENTMCNC: 33.2 G/DL (ref 31.5–36.5)
MCV RBC AUTO: 93 FL (ref 78–100)
PHOSPHATE SERPL-MCNC: 5.1 MG/DL (ref 2.5–4.5)
PLATELET # BLD AUTO: 182 10E3/UL (ref 150–450)
POTASSIUM SERPL-SCNC: 4.5 MMOL/L (ref 3.4–5.3)
PROT SERPL-MCNC: 6.3 G/DL (ref 6.4–8.3)
RBC # BLD AUTO: 2.94 10E6/UL (ref 4.4–5.9)
SODIUM SERPL-SCNC: 135 MMOL/L (ref 136–145)
WBC # BLD AUTO: 15 10E3/UL (ref 4–11)

## 2023-07-26 PROCEDURE — 250N000011 HC RX IP 250 OP 636: Mod: JZ | Performed by: INTERNAL MEDICINE

## 2023-07-26 PROCEDURE — 80053 COMPREHEN METABOLIC PANEL: CPT | Performed by: INTERNAL MEDICINE

## 2023-07-26 PROCEDURE — 85027 COMPLETE CBC AUTOMATED: CPT | Performed by: INTERNAL MEDICINE

## 2023-07-26 PROCEDURE — 36415 COLL VENOUS BLD VENIPUNCTURE: CPT | Performed by: INTERNAL MEDICINE

## 2023-07-26 PROCEDURE — C9113 INJ PANTOPRAZOLE SODIUM, VIA: HCPCS | Mod: JZ | Performed by: INTERNAL MEDICINE

## 2023-07-26 PROCEDURE — 83540 ASSAY OF IRON: CPT | Performed by: INTERNAL MEDICINE

## 2023-07-26 PROCEDURE — 99239 HOSP IP/OBS DSCHRG MGMT >30: CPT | Performed by: INTERNAL MEDICINE

## 2023-07-26 PROCEDURE — 250N000013 HC RX MED GY IP 250 OP 250 PS 637: Performed by: INTERNAL MEDICINE

## 2023-07-26 PROCEDURE — 84100 ASSAY OF PHOSPHORUS: CPT | Performed by: INTERNAL MEDICINE

## 2023-07-26 PROCEDURE — 90937 HEMODIALYSIS REPEATED EVAL: CPT

## 2023-07-26 RX ADMIN — PANTOPRAZOLE SODIUM 40 MG: 40 INJECTION, POWDER, FOR SOLUTION INTRAVENOUS at 11:33

## 2023-07-26 RX ADMIN — CLOPIDOGREL BISULFATE 75 MG: 75 TABLET, FILM COATED ORAL at 11:32

## 2023-07-26 ASSESSMENT — ACTIVITIES OF DAILY LIVING (ADL)
ADLS_ACUITY_SCORE: 26

## 2023-07-26 NOTE — PROGRESS NOTES
Care Management Discharge Note    Discharge Date: 07/26/2023       Discharge Disposition:  home    Discharge Services:  OP dialysis    Discharge DME:  none    Discharge Transportation: family or friend will provide    Private pay costs discussed: Not applicable    Does the patient's insurance plan have a 3 day qualifying hospital stay waiver?  No    PAS Confirmation Code:    Patient/family educated on Medicare website which has current facility and service quality ratings:      Education Provided on the Discharge Plan:    Persons Notified of Discharge Plans: yes  Patient/Family in Agreement with the Plan:      Handoff Referral Completed: Yes    Additional Information:  Pt adequate for discharge. OP dialysis has been set up and pt has been given directions and chair time. No further CM needs. CM will sign off.    Cuca Mercado RN

## 2023-07-26 NOTE — PROGRESS NOTES
Treatment Notes    Treatment time: 3 hours    K bath: k3    UF removed: 1L    Access: CVC  Access complications: None    Pt tolerated tx well    Report given to BASHIR Beltre RN

## 2023-07-26 NOTE — PLAN OF CARE
"  Problem: Acute Kidney Injury/Impairment  Goal: Effective Renal Function  Intervention: Monitor and Support Renal Function  Recent Flowsheet Documentation  Medication Review/Management: medications reviewed   Goal Outcome Evaluation:    Patient voided 275cc bello urine.  Reported having loose stool.  Stool softener held.  Has denied discomfort all evening but just now has requested something for heartburn.  Text page send to MD.    Declined to eat dinner.  \"Not hungry.\"  Has been taking sips of water.  Ambulated halls.  Mostly up in chair d/t \"bed very uncomfortable.\"  Anxious for discharge home.  AVSS.                            "

## 2023-07-26 NOTE — PLAN OF CARE
Problem: Plan of Care - These are the overarching goals to be used throughout the patient stay.    Goal: Optimal Comfort and Wellbeing  Outcome: Progressing   Goal Outcome Evaluation:  Pt is alert and oriented. Up independently in the room. Walked the halls x1. Pt has not slept much tonight, anxious to go home. Uneventful shift.  Dalila Marmolejo RN

## 2023-07-26 NOTE — PLAN OF CARE
Goal Outcome Evaluation:       Pt is A&O x 4, had dialysis @7 am, anxious to leave. Wife at bedside. Discharge orders went over w/ pt. Outpatient dialysis appts set up.     Pt given information regarding a renal diet at time of discharge.

## 2023-07-26 NOTE — DISCHARGE SUMMARY
"[facility]  Hospitalist Discharge Summary    Date of Admission:  7/21/2023  Date of Discharge:  7/26/2023  Discharging Provider: Rebeca Hernandez MD  Discharge Service: Hospitalist Service  Discharge Diagnoses   Biliary stricture  Elevated LFTs  Status post biliary stent placement on 7/22/2023  Acute renal failure  Acute tubular necrosis  Hyperkalemia  Metabolic acidosis  Acute bilateral epistaxis  Acute blood loss anemia  Slow transit constipation  Hyponatremia  Mitral regurgitation  Clinically Significant Risk Factors   # Obesity: Estimated body mass index is 31.89 kg/m  as calculated from the following:    Height as of this encounter: 1.778 m (5' 10\").    Weight as of this encounter: 100.8 kg (222 lb 3.6 oz).       Follow-ups Needed After Discharge   Follow-up Appointments    Follow-up and recommended labs and tests      Follow up with primary care provider, Kadi Quniteros, within 7 days to   evaluate medication change and for hospital follow- up.  The following   labs/tests are recommended: bmp.  Follow up with [facility] for EUS and stent exchange in the next couple of   weeks - their office will call patient to schedule.  [facility], as arranged  for   Monday, Wednesday, Friday. Next HD run on 7/28/23.      Unresulted Labs Ordered in the Past 30 Days of this Admission       Date and Time Order Name Status Description    7/22/2023  2:34 PM Cytology, non-gynecologic In process         These results will be followed up by me.    Discharge Disposition   Discharged to home  Condition at discharge: Stable    Hospital Course   Rafael Santillan is a 68 year old male admitted on 7/21/2023.   He presented with 2 weeks of off-and-on abdominal pain nausea and vomiting was found to have acute renal failure, hyperkalemia, elevated liver enzymes.    Nephrology and GI consulted.  ERCP done on 7/22, biliary stent placed.  Creatinine is worsening with oliguria.  " Cordero's catheter inserted on 7/2.  Patient has also epistaxis, which was controlled with Rhino Rocket and oxymetazoline spray.  Desmopressin/DDAVP given by nephrology on 7/23 for dialysis catheter bleeding.    Resume aspirin and Plavix on 7/25 if no further episodes of bleeding     Abdominal pain and elevated liver enzyme likely due to biliary stricture  CP/biliary stent placed on 7/22.  LFTs are trending down.  Biopsy results are pending.  Acute hepatitis panel is negative  Off Zosyn since 7/23.  LFTs trending down, but still not normalized at discharge.     Acute renal failure with hyperkalemia.  UTI was ruled out.  Baseline UA with mild proteins otherwise bland.Current UA does appear consistent with ATN with granular casts WBCs RBCs and proteinuria.    Hyperkalemia resolved.  Losartan discontinued.  S/p bicarbonate drip for metabolic acidosis.  S/p Cordero's catheter 7/23-25 due to urinary retention and strict output measurement.    NIK,C3-C4, CPK unremarkable.  Baseline creatinine 1.3.    Nephrology planning for outpatient MWF dialysis/patient has been accepted to outpatient HD center.    Mitral regurgitation; does not appear volume overloaded.   7/7/23 echocardiogram demonstrated normal LVEF and no wall motion abnormalities, but moderate to severe mitral regurgitation was noted.     Left Ventricle Normal left ventricular size. Normal left ventricular wall thickness. No   obvious regional wall motion/thickening abnormalities.    Diastolic Function Not applicable.    Right Ventricle Not applicable.    Left Atrium Normal left atrial size.    Right Atrium Not applicable.    Aortic Valve Trileaflet aortic valve. No significant aortic regurgitation.    Mitral Valve Posterior leaflet mitral valve prolapse. Mild to moderate eccentric mitral   regurgitation.    Tricuspid Valve No significant tricuspid regurgitation.    Pulmonic Valve No significant pulmonic regurgitation.    Pericardium No significant pericardial  effusion.    Aorta Not applicable.    Inferior Vena Cava Normal IVC size, >50% collapse with respiration.     Acute epistaxis bilateral nostril-7/23  Likely related to platelet dysfunction-from uremia  Patient does have history of recurrent nosebleeds.  Hemoglobin dropped to 9.1.  And is 9.1 at discharge.  S/p Rhino Rocket and oxymetazoline.  Rhino Rocket removed on 7/24.recurrence of epistaxis.  Resumed aspirin, Plavix on 7/25.  Resumed statin on 7/24     History of CAD with FELICIA in March 2023 to RCA  Aspirin, Plavix was on hold for epistaxis, resumed now as above.       Anemia multifactorial due to nosebleeds, bleeding from dialysis catheter site.  Iron checked and pending at discharge.     GERD-on PPI.       Constipation -on bowel regimen.     Hyponatremia likely due to hypervolemia and renal failure, improved with dialysis.     Leukocytosis likely reactive, improved, remains very mild at 11.6.  Urine culture is negative and therefore DC Zosyn on 7/23     Consultations This Hospital Stay   CARE MANAGEMENT / SOCIAL WORK IP CONSULT  INTERVENTIONAL RADIOLOGY ADULT/PEDS IP CONSULT  CARE MANAGEMENT / SOCIAL WORK IP CONSULT    Code Status   Full Code    Time Spent on this Encounter   I, Rebeca Hernandez MD, personally saw the patient today and spent greater than 30 minutes discharging this patient.       Rebeca Hernandez MD  [address] [address]  Phone: [phone]  Fax: [phone]  ______________________________________________________________________    Physical Exam   Vital Signs: Temp: 98.8  F (37.1  C) Temp src: Oral BP: (!) 162/78 Pulse: 57   Resp: 21 SpO2: 96 % O2 Device: None (Room air)    Weight: 222 lbs 3.58 oz  General: Alert and oriented x 3. Not in obvious distress.  HEENT: NC, AT. Neck- supple, No JVP elevation, lymphadenopathy or thyromegaly. Trachea-central.  Chest: Clear to auscultation bilaterally. Rt chest dialysis catheter-site  intact.  Heart: S1S2 regular. No M/R/G.  Abdomen: Soft. NT, ND. No organomegaly. Bowel sounds- active.  Back: No spine tenderness. No CVA tenderness.  Extremities: No leg swelling. Peripheral pulses 2+ bilaterally.  Neuro: Cranial nerves 1-12 grossly normal. No focal neurological deficit       Primary Care Physician   Kadi Quinteros    Discharge Orders      Reason for your hospital stay    Biliary stenosis, acute renal failure     Activity    Your activity upon discharge: activity as tolerated     Follow-up and recommended labs and tests     Follow up with primary care provider, Kadi Quinteros, within 7 days to evaluate medication change and for hospital follow- up.  The following labs/tests are recommended: bmp.  Follow up with [facility] for E[facility] and stent exchange in the next couple of weeks - their office will call patient to schedule.  [facility], as arranged  for Monday, Wednesday, Friday. Next HD run on 7/28/23.     Diet    Follow this diet upon discharge: Orders Placed This Encounter      Renal Diet (dialysis)     Significant Results and Procedures   Results for orders placed or performed during the hospital encounter of 07/21/23   CT Abdomen Pelvis w/o Contrast    Narrative    EXAM: CT ABDOMEN PELVIS W/O CONTRAST  LOCATION: [facility]  DATE: 7/21/2023    INDICATION: right side abd pain, vomiting for 2 weeks  COMPARISON: 06/20/2010  TECHNIQUE: CT scan of the abdomen and pelvis was performed without IV contrast. Multiplanar reformats were obtained. Dose reduction techniques were used.  CONTRAST: None.    FINDINGS:   LOWER CHEST: Partially imaged coronary artery calcifications    HEPATOBILIARY: Cholecystectomy. Progressive atrophy of the left hepatic lobe with some mild intrahepatic biliary dilation    PANCREAS: Normal.    SPLEEN: Normal.    ADRENAL GLANDS: Normal.    KIDNEYS/BLADDER: No significant mass, stone, or hydronephrosis. Circumferential  bladder wall thickening.    BOWEL: Diverticulosis of the colon. No acute inflammatory change. No obstruction.     LYMPH NODES: Normal.    VASCULATURE: Minimal atherosclerotic calcification. No aneurysm.    PELVIC ORGANS: Enlarged prostate.    MUSCULOSKELETAL: Fat-containing right inguinal hernia. Right lower quadrant spigelian hernia containing nonobstructed small bowel. Mild degenerative changes in the spine.      Impression    IMPRESSION:   1.  Circumferential urinary bladder wall thickening could be secondary to underdistention or cystitis. Correlate with urinalysis.     2.  Sequelae of chronic biliary stricture in the left hepatic lobe with associated mild intrahepatic biliary ductal dilatation and parenchymal atrophy.        XR Surgery JENNY L/T 5 Min Fluoro    Narrative    This exam was marked as non-reportable because it will not be read by a   radiologist or a [place] [facility] non-radiologist provider.         IR CVC Tunnel Placement > 5 Yrs of Age    Narrative    [facility]  LOCATION: [facility]  DATE: 7/23/2023    PROCEDURE: TUNNELED DIALYSIS CATHETER PLACEMENT  1.  Insertion of a tunneled central venous catheter.  2.  Ultrasound guidance for vascular access. A permanent image was stored.  3.  Fluoroscopic guidance for central venous access device placement.  4.  Moderate sedation.    INTERVENTIONAL RADIOLOGIST: John Botello MD    INDICATION: Patient is a 68-year-old male with acute kidney injury and chronic kidney disease. The patient presents to Interventional Radiology for placement of a tunneled hemodialysis catheter for moderate-term central venous access for hemodialysis.    CONSENT: The risks, benefits and alternatives of tunneled hemodialysis catheter placement were discussed with the patient  in detail. All questions were answered. Informed consent was given to proceed with the procedure.    MODERATE SEDATION: Versed 1.5 mg IV; Fentanyl 75 mcg IV. During the time out,  immediately prior to the administration of medications, the patient was reassessed for adequacy to receive conscious sedation.  Under physician supervision, Versed and fentanyl   were administered for moderate sedation. Pulse oximetry, heart rate and blood pressure were continuously monitored by an independent trained observer. The physician spent 10 minutes of face-to-face sedation time with the patient.    CONTRAST: None.  ANTIBIOTICS: 2 g of IV Ancef.  ADDITIONAL MEDICATIONS: None.    FLUOROSCOPIC TIME: 0.3 minutes.  RADIATION DOSE: Air Kerma: 5 mGy.    COMPLICATIONS: No immediate complications.    STERILE BARRIER TECHNIQUE: Maximum sterile barrier technique was used. Cutaneous antisepsis was performed at the operative site with application of 2% chlorhexidine and large sterile drape. Prior to the procedure, the  and assistant performed   hand hygiene and wore hat, mask, sterile gown, and sterile gloves during the entire procedure.    PROCEDURE:    Using local anesthesia and real-time ultrasound guidance the right internal jugular vein was accessed. A permanent ultrasound image was saved, documenting patency and compressibility. A subcutaneous tunnel was created requiring a second incision. Using   this access, a 23 cm tip to cuff 15 Malagasy DuraFlow dialysis catheter was advanced until the tip was in the mid/low right atrium.  The catheter was tested and found to flush and aspirate appropriately.    FINDINGS:  Ultrasound shows an anechoic and compressible jugular vein.      Fluoroscopic spot film at completion of study show that the tunneled dialysis catheter tip lies in the mid/low right atrium.      Impression    IMPRESSION:    1.  Successful tunneled dialysis catheter placement.  2.  The catheter is ready for use.         Discharge Medications   Current Discharge Medication List        CONTINUE these medications which have NOT CHANGED    Details   aspirin 81 MG EC tablet Take 81 mg by mouth At Bedtime       clopidogrel (PLAVIX) 75 MG tablet Take 75 mg by mouth daily      metoprolol succinate ER (TOPROL XL) 25 MG 24 hr tablet Take 25 mg by mouth At Bedtime      nitroGLYcerin (NITROSTAT) 0.4 MG sublingual tablet Place 0.4 mg under the tongue every 5 minutes as needed for chest pain For chest pain place 1 tablet under the tongue every 5 minutes for 3 doses. If symptoms persist 5 minutes after 1st dose call 911.      pantoprazole (PROTONIX) 40 MG EC tablet Take 40 mg by mouth At Bedtime      rosuvastatin (CRESTOR) 40 MG tablet Take 40 mg by mouth At Bedtime      sildenafil (VIAGRA) 100 MG tablet Take 100 mg by mouth daily as needed           STOP taking these medications       losartan (COZAAR) 50 MG tablet Comments:   Reason for Stopping:             Allergies   No Known Allergies

## 2023-07-27 LAB
ATRIAL RATE - MUSE: 68 BPM
DIASTOLIC BLOOD PRESSURE - MUSE: NORMAL MMHG
INTERPRETATION ECG - MUSE: NORMAL
P AXIS - MUSE: 69 DEGREES
PATH REPORT.COMMENTS IMP SPEC: ABNORMAL
PATH REPORT.COMMENTS IMP SPEC: ABNORMAL
PATH REPORT.COMMENTS IMP SPEC: YES
PATH REPORT.FINAL DX SPEC: ABNORMAL
PATH REPORT.GROSS SPEC: ABNORMAL
PATH REPORT.MICROSCOPIC SPEC OTHER STN: ABNORMAL
PATH REPORT.RELEVANT HX SPEC: ABNORMAL
PR INTERVAL - MUSE: 186 MS
QRS DURATION - MUSE: 112 MS
QT - MUSE: 372 MS
QTC - MUSE: 395 MS
R AXIS - MUSE: 44 DEGREES
SYSTOLIC BLOOD PRESSURE - MUSE: NORMAL MMHG
T AXIS - MUSE: 35 DEGREES
VENTRICULAR RATE- MUSE: 68 BPM

## 2023-07-31 PROBLEM — N17.0 ATN (ACUTE TUBULAR NECROSIS) (H): Status: ACTIVE | Noted: 2023-07-31

## 2023-08-01 ENCOUNTER — LAB REQUISITION (OUTPATIENT)
Dept: LAB | Facility: CLINIC | Age: 68
End: 2023-08-01

## 2023-08-01 DIAGNOSIS — R04.0 EPISTAXIS: ICD-10-CM

## 2023-08-01 DIAGNOSIS — N17.9 ACUTE KIDNEY FAILURE, UNSPECIFIED (H): ICD-10-CM

## 2023-08-01 LAB
ALBUMIN SERPL BCG-MCNC: 3.3 G/DL (ref 3.5–5.2)
ALP SERPL-CCNC: 520 U/L (ref 40–129)
ALT SERPL W P-5'-P-CCNC: 96 U/L (ref 0–70)
ANION GAP SERPL CALCULATED.3IONS-SCNC: 12 MMOL/L (ref 7–15)
AST SERPL W P-5'-P-CCNC: 57 U/L (ref 0–45)
BASOPHILS # BLD AUTO: 0 10E3/UL (ref 0–0.2)
BASOPHILS NFR BLD AUTO: 0 %
BILIRUB SERPL-MCNC: 1.3 MG/DL
BUN SERPL-MCNC: 56.4 MG/DL (ref 8–23)
CALCIUM SERPL-MCNC: 8.2 MG/DL (ref 8.8–10.2)
CHLORIDE SERPL-SCNC: 102 MMOL/L (ref 98–107)
CREAT SERPL-MCNC: 5.52 MG/DL (ref 0.67–1.17)
DEPRECATED HCO3 PLAS-SCNC: 25 MMOL/L (ref 22–29)
EOSINOPHIL # BLD AUTO: 0.3 10E3/UL (ref 0–0.7)
EOSINOPHIL NFR BLD AUTO: 3 %
ERYTHROCYTE [DISTWIDTH] IN BLOOD BY AUTOMATED COUNT: 13.8 % (ref 10–15)
GFR SERPL CREATININE-BSD FRML MDRD: 10 ML/MIN/1.73M2
GLUCOSE SERPL-MCNC: 78 MG/DL (ref 70–99)
HCT VFR BLD AUTO: 24.3 % (ref 40–53)
HGB BLD-MCNC: 7.6 G/DL (ref 13.3–17.7)
IMM GRANULOCYTES # BLD: 0.1 10E3/UL
IMM GRANULOCYTES NFR BLD: 1 %
LYMPHOCYTES # BLD AUTO: 0.8 10E3/UL (ref 0.8–5.3)
LYMPHOCYTES NFR BLD AUTO: 7 %
MCH RBC QN AUTO: 30.8 PG (ref 26.5–33)
MCHC RBC AUTO-ENTMCNC: 31.3 G/DL (ref 31.5–36.5)
MCV RBC AUTO: 98 FL (ref 78–100)
MONOCYTES # BLD AUTO: 1 10E3/UL (ref 0–1.3)
MONOCYTES NFR BLD AUTO: 9 %
NEUTROPHILS # BLD AUTO: 8.6 10E3/UL (ref 1.6–8.3)
NEUTROPHILS NFR BLD AUTO: 80 %
NRBC # BLD AUTO: 0 10E3/UL
NRBC BLD AUTO-RTO: 0 /100
PLATELET # BLD AUTO: 224 10E3/UL (ref 150–450)
POTASSIUM SERPL-SCNC: 5.1 MMOL/L (ref 3.4–5.3)
PROT SERPL-MCNC: 6.1 G/DL (ref 6.4–8.3)
RBC # BLD AUTO: 2.47 10E6/UL (ref 4.4–5.9)
SODIUM SERPL-SCNC: 139 MMOL/L (ref 136–145)
WBC # BLD AUTO: 10.9 10E3/UL (ref 4–11)

## 2023-08-01 PROCEDURE — 80053 COMPREHEN METABOLIC PANEL: CPT | Performed by: FAMILY MEDICINE

## 2023-08-01 PROCEDURE — 85025 COMPLETE CBC W/AUTO DIFF WBC: CPT | Performed by: FAMILY MEDICINE

## 2023-08-06 ENCOUNTER — HEALTH MAINTENANCE LETTER (OUTPATIENT)
Age: 68
End: 2023-08-06

## 2023-09-22 ENCOUNTER — MEDICAL CORRESPONDENCE (OUTPATIENT)
Dept: HEALTH INFORMATION MANAGEMENT | Facility: CLINIC | Age: 68
End: 2023-09-22
Payer: COMMERCIAL

## 2023-09-27 ENCOUNTER — HOSPITAL ENCOUNTER (OUTPATIENT)
Dept: INTERVENTIONAL RADIOLOGY/VASCULAR | Facility: HOSPITAL | Age: 68
Discharge: HOME | End: 2023-09-27
Attending: PHYSICIAN ASSISTANT | Admitting: PHYSICIAN ASSISTANT
Payer: COMMERCIAL

## 2023-09-27 VITALS
HEART RATE: 59 BPM | SYSTOLIC BLOOD PRESSURE: 136 MMHG | RESPIRATION RATE: 18 BRPM | OXYGEN SATURATION: 98 % | DIASTOLIC BLOOD PRESSURE: 77 MMHG | TEMPERATURE: 98.4 F

## 2023-09-27 DIAGNOSIS — N18.9 CHRONIC KIDNEY DISEASE: ICD-10-CM

## 2023-09-27 PROCEDURE — 36589 REMOVAL TUNNELED CV CATH: CPT

## 2023-09-27 NOTE — DISCHARGE INSTRUCTIONS
Tunneled Catheter Removal Discharge Instructions  You had your tunneled venous catheter removed today. Please follow the below instructions following your catheter removal:    Care Instructions:  - Ok to remove dressing from catheter exit site 24-48 hours after catheter removal.  - Avoid tub baths, Jacuzzi and pool soaks for 3 days.  - You may shower beginning tomorrow. Do not scrub site until well healed; pat dry.  - If you experience significant bleeding at site, apply pressure with hands above the clavicle bone, sit upright. If bleeding does not resolve quickly, seek immediate medical assistance.    Seek medical assistance for any of the following:   - Uncontrolled bleeding.  - You have a fever (greater than 101 F (38.3C)).  - Purulent (yellow/green/foul smelling) drainage from previous catheter insertion site.  - Increasing redness at previous catheter insertion site.  - Increasing pain at previous catheter insertion site.  - Increasing swelling at previous catheter insertion site    Please contact [facility] at [phone]*** with questions or concerns.

## 2023-09-27 NOTE — PROGRESS NOTES
Pt discharged to home.  Post removal instructions reviewed and acknowledged.  Dressing clean, dry and intact.  Pt independent and ambulatory

## 2024-09-29 ENCOUNTER — HEALTH MAINTENANCE LETTER (OUTPATIENT)
Age: 69
End: 2024-09-29

## 2025-03-11 ENCOUNTER — TRANSFERRED RECORDS (OUTPATIENT)
Dept: HEALTH INFORMATION MANAGEMENT | Facility: CLINIC | Age: 70
End: 2025-03-11
Payer: COMMERCIAL

## 2025-03-24 ENCOUNTER — TRANSCRIBE ORDERS (OUTPATIENT)
Dept: CALL CENTER | Age: 70
End: 2025-03-24
Payer: COMMERCIAL

## 2025-03-24 ENCOUNTER — MEDICAL CORRESPONDENCE (OUTPATIENT)
Dept: HEALTH INFORMATION MANAGEMENT | Facility: CLINIC | Age: 70
End: 2025-03-24
Payer: COMMERCIAL

## 2025-03-24 DIAGNOSIS — C25.9 PANCREATIC CANCER (H): Primary | ICD-10-CM

## 2025-03-25 ENCOUNTER — TELEPHONE (OUTPATIENT)
Dept: GASTROENTEROLOGY | Facility: CLINIC | Age: 70
End: 2025-03-25
Payer: COMMERCIAL

## 2025-03-25 NOTE — TELEPHONE ENCOUNTER
Advanced Endoscopy     Referring provider:  Inocencio Coleman MD affiliated with [facility] at [facility].     Referred to: Advanced Endoscopy Provider Group     Provider Requested:  none specified     Referral Received:  3/24/25     Records received:  CE    IR liver biopsy 2/4/25  A. Liver, Lesion, fine needle aspiration (smears/tissue):   Positive for malignancy.    Metastatic adenocarcinoma   consistent with the patient's known pancreatic primary.     Comment: There is only a scant amount of tumor in the core   biopsies which would be insufficient for further molecular   testing.    CT Abd / Pelvis with contrast 1/29/25  Impression    1. Very small hypoenhancing lesion in segment 8, suspicious for metastasis.  2. Unchanged periportal and peripancreatic lymph nodes.  3. Otherwise no suspicious findings for metastatic disease in the abdomen or pelvis.    CT chest with contrast 1/29/25  Impression    Stable exam. No new or enlarging suspicious pulmonary nodules.      Labs 1/29/25  Carcinoembryonic Ag (CEA), S  ng/mL 2.7     Carbohydrate Ag 19-9, S  <35 U/mL 3,428 High      CT Abd/pelvis with contrast 11/27/24  Impression    Stable hypodensity around the distal end of the metallic stent. No new peripancreatic or retroperitoneal lymph nodes. Previously demonstrated hypodense lesions in the right lobe of the liver are no longer visualized suggestive of resolution.   No new focal liver lesions.    EUS 10/19/23  Impressions/Post-Op Diagnosis:        - A mass was identified in the uncinate process of the pancreas with        upstream dilatation of the bile and pancreas ducts. This was staged T2        N0 M0 by endosonographic criteria. The staging applies if malignancy is        confirmed. Fine needle aspiration performed.        - Previously placed covered metal stent in situ.          FINAL DIAGNOSIS    Pancreas uncinate mass, FNA ([id number]; 10/19/2023):  Sparse atypical glandular cells.  See  comment.    Comment:  Sparse atypical glandular cells are identified on  the smears and cell block material in the background of  reactive pancreatic elements.  The degree of atypia raises  concern/ suspicion for involvement by adenocarcinoma.  However, sparse cellularity prevents a definitive  diagnosis.  Correlation with clinical and imaging findings  is recommended.  Re-biopsy may be indicated for further  evaluation     Images received:  PACs    Insurance Coverage:  [facility]    Evaluation for: Pancreatic cancer (H)  - 3rd opinion on pancreatic cancer, has previoulsy seen [facility] [place]      Clinical History (per RN review):     [facility] radiation oncology note from 1/29/25    REASON FOR CONSULT  Mr. Rafael Santillan is a 69 y.o. male presenting with history of metastatic pancreatic adenocarcinoma of the pancreatic head/uncinate process presenting for consultation regarding the role of radiation therapy given prior good response to chemotherapy.     In July of 2023, Mr. Santillan initially presented with abdominal pain, nausea, and vomiting. Initial workup revealed elevated liver enzymes, acute renal failure, hyperkalemia, and presence of a biliary stricture. Temporary biliary stent was placed and renal dialysis was initiated. In August 18, 2023, his temporary biliary stent was replaced with a covered metal stent and no discrete mass seen by EUS at that time.     In October of 2023, EUS revealed an irregular mass in the uncinate process of the pancreas measuring 3.8 x 2.1 cm mass. EUS biopsy revealed sparse atypical glandular cells are identified on the smears and cell block material in the background of reactive pancreatic elements. The degree of atypia raises concern/ suspicion for involvement by adenocarcinoma. However, sparse cellularity prevented definitive diagnosis. Per patient, he thereafter had multiple prior biopsies of the pancreas as well as three separate biopsies of the liver with no definite identification of  adenocarcinoma.    On November 10th, 2023, Mr. Santillan undergo initial imaging including a CT chest abdomen and pelvis as well as PET CT scan. PET CT revealed marked FDG avidity in the pancreatic head/uncinate process, delisa hepatis lymph nodes, and several liver lesions suspicious for metastasis. CT chest abdomen pelvis at that time also revealed low-density mass present in the uncinate process of the pancreas, which has ill-defined margins measuring approximately 3.4 cm, and is centrally hypodense on the portal venous phase with respect to the body and tail of the pancreas. There was abutment and deformatiy of extrahepatic main portal vein near SMV/splenic confluence. There were several adjacent lymph nodes suspicious for metastasis as well as multiple small hypoattenuating liver lesions consisent with hepatic metastasis.     On November 22, 2023, Mr. Santillan did initiate FOLFIRINOX and oxaliplatin was removed after cycle 9 due to worsening neuropathy. Most recent interval PET CT on September 17, 2024 revealed no significant FDG metabolic activity and his chemotherapy cycles were spaced to q3 weeks. Most recently he received cycle 20 on November 19th, 2024.     Restaging imaging on November 27th, 2024 including CT abdomen pelvis with IV contrast and CT chest with IV contrast report stable hypodensity about the distal end of metallic stent and pancreatic head abutting the main portal vein without narrowing. Previously demonstrated hypodense liver lesions were no longer demonstrated and periportal/portacaval lymph nodes were stable from prior scans with no new concerning lymph nodes.    He was discussed at tumor board on December 5th, 2024 where his response to chemotherapy was felt to be impressive and multidisciplinary team had recommended consideration of radiotherapy to the primary lesion and he was felt not to be a surgical candidate given number of liver lesions present at time of diagnosis.     Restaging imaging  was obtained today including CT abdomen pelvis with IV contrast and CT chest with IV contrast. CT abdomen and pelvis revealed stable appearance of pancreatic head/uncinate process measuring about 2.2 cm axially and 2.2 cm coronally with some increased cystic change. It does reveal abutment of the main portal vein without narrowing. Notably, there was also identification of one subtle 6 mm probable metastasis in segment 8 previously noted on December 8th, 2023 scans where it measured about 11 mm at that time. Indeterminate 1.7 cm periportal, smaller and indeterminate peripancreatic, and stable 8 mm gastrohepatic lymph nodes noted. CT chest with IV contrast is pending at this time.    Labs today most notable for a CA 19-9 of 3428 from 712 in November 2024 and reportedly a lowest value of 172 in June 2024. CEA of 2.7 from 2.7. Direct bilirubin of <0.2, t bili of 0.4. Labs are otherwise relatively stable.       Provider review date:   Provider Decision for clinic consultation/Orders:            Referral updates/Patient contacted: 4/1/25 Dr Blaine Skinner's office contacted, will clarify if this was intended for oncology and what they are looking for in terms of 3rd opinion in care.

## 2025-03-26 ENCOUNTER — DOCUMENTATION ONLY (OUTPATIENT)
Dept: GASTROENTEROLOGY | Facility: CLINIC | Age: 70
End: 2025-03-26
Payer: COMMERCIAL

## 2025-03-26 NOTE — PROGRESS NOTES
Records Requested  03/26/25    Facility  [facility]  Fax: [email or web address]   Outcome Request sent for imaging from 2023 - Now.

## 2025-04-01 ENCOUNTER — TRANSCRIBE ORDERS (OUTPATIENT)
Dept: OTHER | Age: 70
End: 2025-04-01

## 2025-04-01 DIAGNOSIS — C25.9 PANCREATIC CANCER (H): Primary | ICD-10-CM

## 2025-04-07 NOTE — TELEPHONE ENCOUNTER
RECORDS STATUS - ALL OTHER DIAGNOSIS      RECORDS RECEIVED FROM: [facility], Ext: [facility]   NOTES STATUS DETAILS   OFFICE NOTE from referring provider Ext: [facility] 3/11/2025 - Dr. Inocencio Langley   OFFICE NOTE from medical oncologist [facility] 11/29/2024 - Dr. Neeraj Calhoun     OFFICE NOTE from surgery [facility]  1/29/2025 - Dr. Will Bear   DISCHARGE REPORT from the ER [facility] 11/17/2024 - [facility]   MEDICATION LIST Ext: [facility]    LABS     PATHOLOGY REPORTS Req from [facility]:  2/4/2025 - NR-     [facility]:  10/19/2023 - [id number]    ANYTHING RELATED TO DIAGNOSIS [facility] 2/4/2025   PATHOLOGY [facility] TRACKING   TRACKING #: [facility] - [id number]    [facility] - [id number]   GENONOMIC TESTING     TYPE:     IMAGING (NEED IMAGES & REPORT)     CT SCANS PACS PACS:  CT Abdomen Pelvis: 1/29/2025, 11/27/2024, 7/21/2023    [facility]:   CT Chest: 1/29/2025, 11/27/2024    [facility]:  CT Chest: 11/17/2024  CT Abdomen Pelvis: 12/8/2023  CT CAP: 11/10/2023   MRI     XRAYS     ULTRASOUND     PET     IMAGE DISC [employer] TRACKING   TRACKING #:

## 2025-04-09 NOTE — TELEPHONE ENCOUNTER
Action April 9, 2025 11:33 AM AW   Action Taken 4 slides from [place] received and taken to 5th floor path lab for review.    2/4/25: NR-

## 2025-04-10 ENCOUNTER — LAB REQUISITION (OUTPATIENT)
Dept: LAB | Facility: CLINIC | Age: 70
End: 2025-04-10
Payer: COMMERCIAL

## 2025-04-10 PROCEDURE — 88321 CONSLTJ&REPRT SLD PREP ELSWR: CPT | Mod: GC | Performed by: PATHOLOGY

## 2025-04-11 LAB
PATH REPORT.COMMENTS IMP SPEC: ABNORMAL
PATH REPORT.COMMENTS IMP SPEC: YES
PATH REPORT.FINAL DX SPEC: ABNORMAL
PATH REPORT.GROSS SPEC: ABNORMAL
PATH REPORT.MICROSCOPIC SPEC OTHER STN: ABNORMAL
PATH REPORT.RELEVANT HX SPEC: ABNORMAL
PATH REPORT.RELEVANT HX SPEC: ABNORMAL
PATH REPORT.SITE OF ORIGIN SPEC: ABNORMAL

## 2025-04-14 ENCOUNTER — LAB REQUISITION (OUTPATIENT)
Dept: LAB | Facility: CLINIC | Age: 70
End: 2025-04-14
Payer: COMMERCIAL

## 2025-04-14 LAB
PATH REPORT.COMMENTS IMP SPEC: NORMAL
PATH REPORT.FINAL DX SPEC: NORMAL
PATH REPORT.GROSS SPEC: NORMAL
PATH REPORT.MICROSCOPIC SPEC OTHER STN: NORMAL
PATH REPORT.RELEVANT HX SPEC: NORMAL
PATH REPORT.RELEVANT HX SPEC: NORMAL
PATH REPORT.SITE OF ORIGIN SPEC: NORMAL

## 2025-04-14 PROCEDURE — 88321 CONSLTJ&REPRT SLD PREP ELSWR: CPT | Mod: GC | Performed by: PATHOLOGY

## 2025-04-21 ENCOUNTER — ONCOLOGY VISIT (OUTPATIENT)
Dept: ONCOLOGY | Facility: CLINIC | Age: 70
End: 2025-04-21
Attending: INTERNAL MEDICINE
Payer: COMMERCIAL

## 2025-04-21 ENCOUNTER — PRE VISIT (OUTPATIENT)
Dept: ONCOLOGY | Facility: CLINIC | Age: 70
End: 2025-04-21
Payer: COMMERCIAL

## 2025-04-21 VITALS
HEART RATE: 72 BPM | DIASTOLIC BLOOD PRESSURE: 77 MMHG | WEIGHT: 217.3 LBS | OXYGEN SATURATION: 98 % | HEIGHT: 68 IN | TEMPERATURE: 98 F | RESPIRATION RATE: 16 BRPM | BODY MASS INDEX: 32.93 KG/M2 | SYSTOLIC BLOOD PRESSURE: 125 MMHG

## 2025-04-21 DIAGNOSIS — C25.9 MALIGNANT NEOPLASM OF PANCREAS, UNSPECIFIED LOCATION OF MALIGNANCY (H): Primary | ICD-10-CM

## 2025-04-21 PROCEDURE — 99213 OFFICE O/P EST LOW 20 MIN: CPT | Performed by: STUDENT IN AN ORGANIZED HEALTH CARE EDUCATION/TRAINING PROGRAM

## 2025-04-21 ASSESSMENT — PAIN SCALES - GENERAL: PAINLEVEL_OUTOF10: NO PAIN (0)

## 2025-04-21 NOTE — PROGRESS NOTES
[facility]  Medical Oncology Initial Patient Visit Note    Patient name: Rafael Santillan  YOB: 1955  Visit date: 4/21/2025    Oncologic History:  Diagnosis/ stage of cancer: metastatic pancreatic cancer, liver bx proven metastasis (2/4/2025), NGS not done yet (2/4/2025 liver biopsy did not have enough tissue)  Prior treatment(s): FOLFIRINOX, FOLFIRI  Current treatment(s):  FOLFIRINOX  Treatment intent: palliative    Care Team  Medical oncologist: [facility], [facility] Neeraj Calhoun M.D and Michelle TAO  Surgical oncologist:  N/A  Radiation oncologist:  N/A  Other members of care team: N/A  Primary caregiver at home/ contact:  Kinsey Carballo [phone]    Reason for consultation/ patient visit:  second opinion for metastatic pancreatic adenocarcinoma    History of presenting illness:  Mr. Rafael Santillan is a 70 year old male with metastatic pancreatic cancer.     7/21/2023: presented with abd pain, n/v. W/up showed elevated LFTs, KATHY, hyperkalemia. CT a/p showed a biliary stricture a/w mild intrahepatic biliary ductal dilatation, and underwent a stent placement. Renal dialysis initiated.   8/18/2023: ERCP/EUS, temporary biliary stent removed, a covered metal was placed. CBD stricture and brushing showed atypical cells.   10/19/2023: EUS and Bx: An irregular mass was identified in the uncinate process of the pancreas, measuring 38 x21 mm. Sparse atypical glandular cells are identified on the smears and cell block material in the background of reactive pancreatic elements. The degree of atypia raises concern/ suspicion for involvement by adenocarcinoma.   11/2/2023: CA 19-8=99767  11/10/2023: PET/CT showed marked FDG avidity in the pancreatic head (max SUV=10.8), delisa hepatis LNs (maxSUV 10.1), and three FDG avid liver lesions (maxSUV=9.9) consistent with metastasis.   11/22/2023: Started on FOLFIRINOX (oxaliplatin removed from cycle 9 due to neuropathy). CA  19-5=60075.9  12/6/2023: CA 19-1=6577  4/2/2024: CA 19-9=381  6/2024: CA 19-9=172  9/2024: FOLFIRI cycle 17, change treatment to every 3 weeks  11/19/2024: FOLFIRI cycle 20, Ca 19-9 continuing to increase since discontinuation of the oxaliplatin  12/5/2024: Tumor board. Very impressing case by his response to therapy. Recommended to consider radiation to the priamry lesion. Did not feel he was a surgical candidate due to the number of liver lesion present at time of diagnosis.   12/20/2024: CA 19-4=0499  1/7/2025: CA 19-3=8543  1/28/2025: CA 19-3=0985  1/29/2025: f/up with Michelle Cancino. Recent MRI showed a liver lesion that is increasing in size (MRI scan report is not available). Plan was made for liver bx given that he has not had tissue confirmation that the liver lesion is metastatic.   2/4/2025. EUS liver Bx: Positive for adenocarcinoma, pancreatic primary. Insufficient tissue for TEMPUS testing.    2/18/2025: CA 19-9=5329  4/18/2025: CA 19-=6209. Last f/up with Dr. Michelle Cancino. Restarted oxaliplatin with his last two cycles of FOLFIRI. CT c/a/p showed pancreatic lesion and adjacent node are slightly smaller, one hepatic lesion is less apparent and another is apparent compared to 1/29/2025. No e/o mets in chest.     Interval history:  The pt expressed that he is hoping for cure, and that he wanted to hear if 1) the treatment that he has been getting by [facility] and [place] have been appropriate, and 2) if there is any different approach we can do to pursue a curative intent. The reason he has been skeptical is that he was started on FOLFIRINOX in 2023 w/o a 100% confirmation of metastatic pancreatic adenoCa.     The patient does not report any SOB, CP, abd pain/n/v/d, dysuria, joint pain, rash, or fever.   Has not had any side effects with chemo.   He still has neuropathy, more in the legs> UEs. They are annoying but not causing issues.     Past medical history:  CAD s/p stent    Past surgical  "history:  Appendectomy, cholecystectomy    Social history:   Former smoker: 70 pack-yr smoking hx, quit 1991  Etoh: almost day, 1-2 beer  No illicits.   He lives in a house with his partner Kinsey.   He would be able to walk miles.   He has three adult kids.     Family history:  Mother: CRC    Allergies:   Gadolinium    Outpatient medications:   ASA 81, clopidogrel, metoprolol succinate, NTG prn, pantoprazole, rosuvastatin, sildenafil prn.     Physical examination:  /77 (BP Location: Right arm, Patient Position: Sitting, Cuff Size: Adult Regular)   Pulse 72   Temp 98  F (36.7  C) (Oral)   Resp 16   Ht 1.731 m (5' 8.13\")   Wt 98.6 kg (217 lb 4.8 oz)   SpO2 98%   BMI 32.92 kg/m      ECOG performance status:  0    GENERAL: Well-nourished healthy-appearing sitting in chair  HEENT: No icterus, no pallor.   LUNGS: Normal work of breathing.   CARDIOVASCULAR: Regular rate and rhythm  ABDOMEN: Soft, nontender  EXTREMITIES: No edema   NEUROLOGIC: Alert and oriented    Lab data:  I have personally reviewed the following lab data:     12/20/2024: WBC 9, Hb 10.9, plt 127, Scr 1.4, Tbili 0.6, ALT 86, AST 44, alk phos 280  1/7/2025: WBC 8.1, Hb 12.8, plt 122, Scr 1.5, Tbili 0.7, ALT 31, AST 39, alk pohs 176  1/26/2025: WBC 4.5, Hb 12.5, Plt 171, Scr 1.6, Tbili 05, ALT 29, AST 40, alk phos 153  2/18/2025: WBC 4, Hb 12.6, plt 150, Scr 1.4, Tbili 0.6, ALT 56, AST 45, alk phos 221  3/11/2025 WBC 4.6, Hb 13, plt 187  4/18/2025: Scr 1.5, Tbili 0.3, ALT 47, alk phos 215    Radiology data:  I have personally reviewed the images of / or the following radiology data:     11/27/2024 CT c/a/p: Stable hypodensity around the distal end of the metallic stent. No new peripancratic or retroperitoneal LNs. Previously demonstrated hypodense lesions in the right lobe of the liver are no longer visualized suggestive of resolution. A few tiny lung nodues are stable. No suspicious lesion. No enlarged thoracic LsN.  1/29/2025 CT a/p: small " hypoenhancing lesion in segment 8, suspicious for metastasis, unchanged periportal and peripancreatic LNs.     Pathology and other data:  I have personally reviewed and interpreted the following data:      8/18/2023: ERCP/EUS, CBD stricture and brushing showed atypical cells.   10/19/2023: EUS and Bx: The degree of atypia raises concern/ suspicion for involvement by adenocarcinoma.   2/4/2025. EUS liver Bx: Positive for adenocarcinoma, pancreatic primary. Insufficient tissue for TEMPUS testing.    4/18/2025: CT c/a/p: Pancreatic lesion and adjacent node are slightly smaller. One hepatic lesion is less apparent and another is more apparent compared to 1/29/2025. No e/o mets disease in the chest.     Assessment and Plan:  Mr. Rafael Santillan is a 70 year old male with metastatic pancreatic cancer.     Problem list:    #Metastatic pancreatic adenocarcinoma  metastatic pancreatic cancer, liver bx proven metastasis (2/4/2025), NGS not done yet (2/4/2025 liver biopsy did not have enough tissue)    Mr. Santillan has been f/up by [facility] and [place] for his metastatic pancreatic adenocarcinoma. He has a suspected adenocarcinoma biopsy result from his pancreas in 10/2023 along with marked elevated CA 19-9 > 10,000s at that time. He has been treated with FOLFIRINOX, then FOLFIRI (omiting oxaliplatin from C9 due to neuropathy), then spacing out to every 3 weeks cycles, which decreased his CA 19-9 level down to 172 (6/2024). Subsequently, his tumor marker increased to 3430 on 2/18/2025 and was resumed with oxaliplatin for the past 2 cycles (has received about 10 cycles of oxaliplatin so far since the initiation of chemo). Given that he has not had confirmation of metastatic liver disease, he underwent EUS Liver bx on 2/4/2025, which confirmed liver mets from his primary adenocarcinoma. Unfortunately, there was not enough tissue for TEMPUS testing. His most recent CA 19-9 was up to 6209, which suggest he might have started to lose  response to FOLFIRINOX.     After reviewing his clinical course, we agree that he has a metastatic pancreatic cancer. While he has hoped for a curative intent treatment, we rarely pursue curative surgery when you have a metastatic pancreatic cancer, understanding that some academic centers and clinical trials may offer this in a rare situation. Overall, we reassured him that he has received standard treatment for his disease by [facility] and [place]. One thing we suggested today is a liquid bx for both germline and somatic testing, given that this may provider additional treatment options like a PARP inhibitor if he were to have BRCA mutation.     Plan:  -we agree with [facility] and [place] that Mr. Santillan has metastatic pancreatic adenocarcinoma, and that systemic treatments that he has received (FOLFIRINOX, FOLFIRI) are standard of care in his case, as palliative approach.   -we recommend liquid bx for both germline and somatic testing as above, to be considered by [facility]/[place].    The patient and family asked numerous excellent questions which I answered to the best of my abilities. At the completion of our consultation, the patient and accompanying caregivers had an excellent understanding of the plan. They have our contact information for any further questions or concerns and of course, as always, we are available in the interim.       The above plan was discussed with Dr. Nye, who agrees with the assessment and plan.     Boris Edouard MD  Hem/onc fellow  Division of Hematology, Oncology, and Transplantation  [facility]    --------------      Physician Attestation    I, Jose Nye, saw and evaluated Rafael Santillan in-person and agree with the resident/ fellow documentation by Dr. Edouard.    I have reviewed and discussed with them the history, physical exam, and plan.  I personally reviewed the vital signs, interval events, medications, labs and imaging.      I personally performed the  substantive portion of the medical decision making for this visit - please see the full documentation for full details.      Key management decisions made by me and carried out under my direction:   Bessie 71 yo man with biopsy proven met to liver from pancreas adenocarcinoma primary.  Received FOLFIRINOX/ FOLFIRI, tolerating quite well.  We reviewed care paradigm.  We agreed with all care so far, and recommended germline and somatic testing (liquid biopsy if needed) to gather molecular info that may guide systemic therapy options.  He has our contact info if needed.       I spent a total of 90 minutes on the date of service including preparation time (e.g., review of records and interpretation of tests), visit time with the patient and care partners, requesting interventions, communicating with other health care professionals, and documentation.      Date of Service (when I saw the patient): 04/21/25    Jose Nye M.D.   of Medicine  Division of Hematology, Oncology and Transplantation  [facility]

## 2025-04-21 NOTE — LETTER
4/21/2025      Rafael Santillan  [address]  [facility] [address]      Dear Colleague,    Thank you for referring your patient, Rafael Santillan, to the [facility]. Please see a copy of my visit note below.    [facility]  Medical Oncology Initial Patient Visit Note    Patient name: Rafael Santillan  YOB: 1955  Visit date: 4/21/2025    Oncologic History:  Diagnosis/ stage of cancer: metastatic pancreatic cancer, liver bx proven metastasis (2/4/2025), NGS not done yet (2/4/2025 liver biopsy did not have enough tissue)  Prior treatment(s): FOLFIRINOX, FOLFIRI  Current treatment(s):  FOLFIRINOX  Treatment intent: palliative    Care Team  Medical oncologist: [facility], [facility] Neeraj Calhoun M.D and Michelle TAO  Surgical oncologist:  N/A  Radiation oncologist:  N/A  Other members of care team: N/A  Primary caregiver at home/ contact:  Kinsey Scotter [phone]    Reason for consultation/ patient visit:  second opinion for metastatic pancreatic adenocarcinoma    History of presenting illness:  Mr. Rafael Santillan is a 70 year old male with metastatic pancreatic cancer.     7/21/2023: presented with abd pain, n/v. W/up showed elevated LFTs, KATHY, hyperkalemia. CT a/p showed a biliary stricture a/w mild intrahepatic biliary ductal dilatation, and underwent a stent placement. Renal dialysis initiated.   8/18/2023: ERCP/EUS, temporary biliary stent removed, a covered metal was placed. CBD stricture and brushing showed atypical cells.   10/19/2023: EUS and Bx: An irregular mass was identified in the uncinate process of the pancreas, measuring 38 x21 mm. Sparse atypical glandular cells are identified on the smears and cell block material in the background of reactive pancreatic elements. The degree of atypia raises concern/ suspicion for involvement by adenocarcinoma.   11/2/2023: CA 19-5=68574  11/10/2023: PET/CT showed marked FDG avidity  in the pancreatic head (max SUV=10.8), delisa hepatis LNs (maxSUV 10.1), and three FDG avid liver lesions (maxSUV=9.9) consistent with metastasis.   11/22/2023: Started on FOLFIRINOX (oxaliplatin removed from cycle 9 due to neuropathy). CA 19-2=09788.9  12/6/2023: CA 19-3=2443  4/2/2024: CA 19-9=381  6/2024: CA 19-9=172  9/2024: FOLFIRI cycle 17, change treatment to every 3 weeks  11/19/2024: FOLFIRI cycle 20, Ca 19-9 continuing to increase since discontinuation of the oxaliplatin  12/5/2024: Tumor board. Very impressing case by his response to therapy. Recommended to consider radiation to the priamry lesion. Did not feel he was a surgical candidate due to the number of liver lesion present at time of diagnosis.   12/20/2024: CA 19-3=3875  1/7/2025: CA 19-2=2345  1/28/2025: CA 19-0=4273  1/29/2025: f/up with Michelle Cancino. Recent MRI showed a liver lesion that is increasing in size (MRI scan report is not available). Plan was made for liver bx given that he has not had tissue confirmation that the liver lesion is metastatic.   2/4/2025. EUS liver Bx: Positive for adenocarcinoma, pancreatic primary. Insufficient tissue for TEMPUS testing.    2/18/2025: CA 19-3=9539  4/18/2025: CA 19-=6209. Last f/up with Dr. Michelle Cancino. Restarted oxaliplatin with his last two cycles of FOLFIRI. CT c/a/p showed pancreatic lesion and adjacent node are slightly smaller, one hepatic lesion is less apparent and another is apparent compared to 1/29/2025. No e/o mets in chest.     Interval history:  The pt expressed that he is hoping for cure, and that he wanted to hear if 1) the treatment that he has been getting by [facility] and [place] have been appropriate, and 2) if there is any different approach we can do to pursue a curative intent. The reason he has been skeptical is that he was started on FOLFIRINOX in 2023 w/o a 100% confirmation of metastatic pancreatic adenoCa.     The patient does not report any SOB, CP, abd pain/n/v/d,  "dysuria, joint pain, rash, or fever.   Has not had any side effects with chemo.   He still has neuropathy, more in the legs> UEs. They are annoying but not causing issues.     Past medical history:  CAD s/p stent    Past surgical history:  Appendectomy, cholecystectomy    Social history:   Former smoker: 70 pack-yr smoking hx, quit 1991  Etoh: almost day, 1-2 beer  No illicits.   He lives in a house with his partner Kinsey.   He would be able to walk miles.   He has three adult kids.     Family history:  Mother: CRC    Allergies:   Gadolinium    Outpatient medications:   ASA 81, clopidogrel, metoprolol succinate, NTG prn, pantoprazole, rosuvastatin, sildenafil prn.     Physical examination:  /77 (BP Location: Right arm, Patient Position: Sitting, Cuff Size: Adult Regular)   Pulse 72   Temp 98  F (36.7  C) (Oral)   Resp 16   Ht 1.731 m (5' 8.13\")   Wt 98.6 kg (217 lb 4.8 oz)   SpO2 98%   BMI 32.92 kg/m      ECOG performance status:  0    GENERAL: Well-nourished healthy-appearing sitting in chair  HEENT: No icterus, no pallor.   LUNGS: Normal work of breathing.   CARDIOVASCULAR: Regular rate and rhythm  ABDOMEN: Soft, nontender  EXTREMITIES: No edema   NEUROLOGIC: Alert and oriented    Lab data:  I have personally reviewed the following lab data:     12/20/2024: WBC 9, Hb 10.9, plt 127, Scr 1.4, Tbili 0.6, ALT 86, AST 44, alk phos 280  1/7/2025: WBC 8.1, Hb 12.8, plt 122, Scr 1.5, Tbili 0.7, ALT 31, AST 39, alk pohs 176  1/26/2025: WBC 4.5, Hb 12.5, Plt 171, Scr 1.6, Tbili 05, ALT 29, AST 40, alk phos 153  2/18/2025: WBC 4, Hb 12.6, plt 150, Scr 1.4, Tbili 0.6, ALT 56, AST 45, alk phos 221  3/11/2025 WBC 4.6, Hb 13, plt 187  4/18/2025: Scr 1.5, Tbili 0.3, ALT 47, alk phos 215    Radiology data:  I have personally reviewed the images of / or the following radiology data:     11/27/2024 CT c/a/p: Stable hypodensity around the distal end of the metallic stent. No new peripancratic or retroperitoneal LNs. " Previously demonstrated hypodense lesions in the right lobe of the liver are no longer visualized suggestive of resolution. A few tiny lung nodues are stable. No suspicious lesion. No enlarged thoracic LsN.  1/29/2025 CT a/p: small hypoenhancing lesion in segment 8, suspicious for metastasis, unchanged periportal and peripancreatic LNs.     Pathology and other data:  I have personally reviewed and interpreted the following data:      8/18/2023: ERCP/EUS, CBD stricture and brushing showed atypical cells.   10/19/2023: EUS and Bx: The degree of atypia raises concern/ suspicion for involvement by adenocarcinoma.   2/4/2025. EUS liver Bx: Positive for adenocarcinoma, pancreatic primary. Insufficient tissue for TEMPUS testing.    4/18/2025: CT c/a/p: Pancreatic lesion and adjacent node are slightly smaller. One hepatic lesion is less apparent and another is more apparent compared to 1/29/2025. No e/o mets disease in the chest.     Assessment and Plan:  Mr. Rafael Santillan is a 70 year old male with metastatic pancreatic cancer.     Problem list:    #Metastatic pancreatic adenocarcinoma  metastatic pancreatic cancer, liver bx proven metastasis (2/4/2025), NGS not done yet (2/4/2025 liver biopsy did not have enough tissue)    Mr. Santillan has [place] [facility] [facility] and [place] for his metastatic pancreatic adenocarcinoma. He has a suspected adenocarcinoma biopsy result from his pancreas in 10/2023 along with marked elevated CA 19-9 > 10,000s at that time. He has been treated with FOLFIRINOX, then FOLFIRI (omiting oxaliplatin from C9 due to neuropathy), then spacing out to every 3 weeks cycles, which decreased his CA 19-9 level down to 172 (6/2024). Subsequently, his tumor marker increased to 3430 on 2/18/2025 and was resumed with oxaliplatin for the past 2 cycles (has received about 10 cycles of oxaliplatin so far since the initiation of chemo). Given that he has not had confirmation of metastatic liver disease, he underwent  EUS Liver bx on 2/4/2025, which confirmed liver mets from his primary adenocarcinoma. Unfortunately, there was not enough tissue for TEMPUS testing. His most recent CA 19-9 was up to 6209, which suggest he might have started to lose response to FOLFIRINOX.     After reviewing his clinical course, we agree that he has a metastatic pancreatic cancer. While he has hoped for a curative intent treatment, we rarely pursue curative surgery when you have a metastatic pancreatic cancer, understanding that some academic centers and clinical trials may offer this in a rare situation. Overall, we reassured him that he has received standard treatment for his disease by [facility] and [place]. One thing we suggested today is a liquid bx for both germline and somatic testing, given that this may provider additional treatment options like a PARP inhibitor if he were to have BRCA mutation.     Plan:  -we agree with [facility] and [place] that Mr. Santillan has metastatic pancreatic adenocarcinoma, and that systemic treatments that he has received (FOLFIRINOX, FOLFIRI) are standard of care in his case, as palliative approach.   -we recommend liquid bx for both germline and somatic testing as above, to be considered by [facility]/[place].    The patient and family asked numerous excellent questions which I answered to the best of my abilities. At the completion of our consultation, the patient and accompanying caregivers had an excellent understanding of the plan. They have our contact information for any further questions or concerns and of course, as always, we are available in the interim.       The above plan was discussed with Dr. Nye, who agrees with the assessment and plan.     Boris Edouard MD  Hem/onc fellow  Division of Hematology, Oncology, and Transplantation  [facility]    --------------      Physician Attestation    I, Jose Nye, saw and evaluated Rafael Santillan in-person and agree with the resident/ fellow  documentation by Dr. Edouard.    I have reviewed and discussed with them the history, physical exam, and plan.  I personally reviewed the vital signs, interval events, medications, labs and imaging.      I personally performed the substantive portion of the medical decision making for this visit - please see the full documentation for full details.      Key management decisions made by me and carried out under my direction:   Bessie 71 yo man with biopsy proven met to liver from pancreas adenocarcinoma primary.  Received FOLFIRINOX/ FOLFIRI, tolerating quite well.  We reviewed care paradigm.  We agreed with all care so far, and recommended germline and somatic testing (liquid biopsy if needed) to gather molecular info that may guide systemic therapy options.  He has our contact info if needed.       I spent a total of 90 minutes on the date of service including preparation time (e.g., review of records and interpretation of tests), visit time with the patient and care partners, requesting interventions, communicating with other health care professionals, and documentation.      Date of Service (when I saw the patient): 04/21/25    Jose Nye M.D.   of Medicine  Division of Hematology, Oncology and Transplantation  [facility]       Again, thank you for allowing me to participate in the care of your patient.        Sincerely,        Jose Nye MD    Electronically signed

## (undated) DEVICE — PLATE GROUNDING ADULT W/CORD 9165L

## (undated) DEVICE — TUBING SUCTION MEDI-VAC 1/4"X20' N620A - HE

## (undated) DEVICE — FORCEP BIOPSY 1.8MM PEDS 100512

## (undated) DEVICE — BALLOON EXTRACTION 15X1950MM 3.2MM TL B-V243Q-A

## (undated) DEVICE — ENDO FUSION OMNI-TOME G31903

## (undated) DEVICE — WIRE GUIDE 0.35X270CM STRAIGHT TIP VISIGLIDE G-260-3527S

## (undated) DEVICE — SUCTION MANIFOLD NEPTUNE 2 SYS 1 PORT 702-025-000

## (undated) DEVICE — ENDO BRUSH BILIARY CYTOLOGY 8FRX200CM M00545000

## (undated) DEVICE — CATH BALLOON DILATING BIL FUSION TITAN 12FRX6MM FS-BDB-6X4

## (undated) DEVICE — SOL WATER IRRIG 1000ML BOTTLE 2F7114

## (undated) RX ORDER — LIDOCAINE HYDROCHLORIDE 10 MG/ML
INJECTION, SOLUTION INFILTRATION; PERINEURAL
Status: DISPENSED
Start: 2023-09-27

## (undated) RX ORDER — HEPARIN SODIUM 1000 [USP'U]/ML
INJECTION, SOLUTION INTRAVENOUS; SUBCUTANEOUS
Status: DISPENSED
Start: 2023-07-23

## (undated) RX ORDER — FENTANYL CITRATE 50 UG/ML
INJECTION, SOLUTION INTRAMUSCULAR; INTRAVENOUS
Status: DISPENSED
Start: 2023-07-22

## (undated) RX ORDER — PROPOFOL 10 MG/ML
INJECTION, EMULSION INTRAVENOUS
Status: DISPENSED
Start: 2023-07-22

## (undated) RX ORDER — ONDANSETRON 2 MG/ML
INJECTION INTRAMUSCULAR; INTRAVENOUS
Status: DISPENSED
Start: 2023-07-22

## (undated) RX ORDER — LIDOCAINE HYDROCHLORIDE 10 MG/ML
INJECTION, SOLUTION INFILTRATION; PERINEURAL
Status: DISPENSED
Start: 2023-07-23

## (undated) RX ORDER — FENTANYL CITRATE 50 UG/ML
INJECTION, SOLUTION INTRAMUSCULAR; INTRAVENOUS
Status: DISPENSED
Start: 2023-07-23

## (undated) RX ORDER — GLYCOPYRROLATE 0.2 MG/ML
INJECTION, SOLUTION INTRAMUSCULAR; INTRAVENOUS
Status: DISPENSED
Start: 2023-07-22

## (undated) RX ORDER — DEXAMETHASONE SODIUM PHOSPHATE 10 MG/ML
INJECTION, SOLUTION INTRAMUSCULAR; INTRAVENOUS
Status: DISPENSED
Start: 2023-07-22

## (undated) RX ORDER — CEFAZOLIN SODIUM/WATER 2 G/20 ML
SYRINGE (ML) INTRAVENOUS
Status: DISPENSED
Start: 2023-07-23